# Patient Record
Sex: FEMALE | Race: BLACK OR AFRICAN AMERICAN | Employment: PART TIME | URBAN - METROPOLITAN AREA
[De-identification: names, ages, dates, MRNs, and addresses within clinical notes are randomized per-mention and may not be internally consistent; named-entity substitution may affect disease eponyms.]

---

## 2018-12-11 ENCOUNTER — OFFICE VISIT (OUTPATIENT)
Dept: FAMILY MEDICINE CLINIC | Age: 38
End: 2018-12-11

## 2018-12-11 ENCOUNTER — HOSPITAL ENCOUNTER (OUTPATIENT)
Dept: LAB | Age: 38
Discharge: HOME | End: 2018-12-11

## 2018-12-11 VITALS
WEIGHT: 183 LBS | DIASTOLIC BLOOD PRESSURE: 75 MMHG | SYSTOLIC BLOOD PRESSURE: 114 MMHG | HEART RATE: 78 BPM | HEIGHT: 67 IN | RESPIRATION RATE: 17 BRPM | TEMPERATURE: 98.3 F | BODY MASS INDEX: 28.72 KG/M2 | OXYGEN SATURATION: 100 %

## 2018-12-11 DIAGNOSIS — D50.9 IRON DEFICIENCY ANEMIA, UNSPECIFIED IRON DEFICIENCY ANEMIA TYPE: ICD-10-CM

## 2018-12-11 DIAGNOSIS — M54.50 LOW BACK PAIN WITHOUT SCIATICA, UNSPECIFIED BACK PAIN LATERALITY, UNSPECIFIED CHRONICITY: Primary | ICD-10-CM

## 2018-12-11 DIAGNOSIS — E55.9 VITAMIN D DEFICIENCY: ICD-10-CM

## 2018-12-11 DIAGNOSIS — M62.838 MUSCLE SPASM: ICD-10-CM

## 2018-12-11 DIAGNOSIS — R53.83 FATIGUE, UNSPECIFIED TYPE: ICD-10-CM

## 2018-12-11 DIAGNOSIS — E53.8 VITAMIN B12 DEFICIENCY: ICD-10-CM

## 2018-12-11 DIAGNOSIS — R20.0 NUMBNESS: ICD-10-CM

## 2018-12-11 DIAGNOSIS — M25.50 ARTHRALGIA, UNSPECIFIED JOINT: ICD-10-CM

## 2018-12-11 PROCEDURE — 99001 SPECIMEN HANDLING PT-LAB: CPT

## 2018-12-11 RX ORDER — IBUPROFEN 800 MG/1
TABLET ORAL
COMMUNITY
End: 2018-12-12

## 2018-12-11 RX ORDER — TIZANIDINE HYDROCHLORIDE 2 MG/1
2 CAPSULE, GELATIN COATED ORAL 3 TIMES DAILY
COMMUNITY
End: 2020-02-05 | Stop reason: SDUPTHER

## 2018-12-11 NOTE — PATIENT INSTRUCTIONS
Low Back Pain: Exercises  Your Care Instructions  Here are some examples of typical rehabilitation exercises for your condition. Start each exercise slowly. Ease off the exercise if you start to have pain. Your doctor or physical therapist will tell you when you can start these exercises and which ones will work best for you. How to do the exercises  Press-up    1. Lie on your stomach, supporting your body with your forearms. 2. Press your elbows down into the floor to raise your upper back. As you do this, relax your stomach muscles and allow your back to arch without using your back muscles. As your press up, do not let your hips or pelvis come off the floor. 3. Hold for 15 to 30 seconds, then relax. 4. Repeat 2 to 4 times. Alternate arm and leg (bird dog) exercise    1. Start on the floor, on your hands and knees. 2. Tighten your belly muscles. 3. Raise one leg off the floor, and hold it straight out behind you. Be careful not to let your hip drop down, because that will twist your trunk. 4. Hold for about 6 seconds, then lower your leg and switch to the other leg. 5. Repeat 8 to 12 times on each leg. 6. Over time, work up to holding for 10 to 30 seconds each time. 7. If you feel stable and secure with your leg raised, try raising the opposite arm straight out in front of you at the same time. Knee-to-chest exercise    1. Lie on your back with your knees bent and your feet flat on the floor. 2. Bring one knee to your chest, keeping the other foot flat on the floor (or keeping the other leg straight, whichever feels better on your lower back). 3. Keep your lower back pressed to the floor. Hold for at least 15 to 30 seconds. 4. Relax, and lower the knee to the starting position. 5. Repeat with the other leg. Repeat 2 to 4 times with each leg. 6. To get more stretch, put your other leg flat on the floor while pulling your knee to your chest.    Curl-ups    1.  Lie on the floor on your back with your knees bent at a 90-degree angle. Your feet should be flat on the floor, about 12 inches from your buttocks. 2. Cross your arms over your chest. If this bothers your neck, try putting your hands behind your neck (not your head), with your elbows spread apart. 3. Slowly tighten your belly muscles and raise your shoulder blades off the floor. 4. Keep your head in line with your body, and do not press your chin to your chest.  5. Hold this position for 1 or 2 seconds, then slowly lower yourself back down to the floor. 6. Repeat 8 to 12 times. Pelvic tilt exercise    1. Lie on your back with your knees bent. 2. \"Brace\" your stomach. This means to tighten your muscles by pulling in and imagining your belly button moving toward your spine. You should feel like your back is pressing to the floor and your hips and pelvis are rocking back. 3. Hold for about 6 seconds while you breathe smoothly. 4. Repeat 8 to 12 times. Heel dig bridging    1. Lie on your back with both knees bent and your ankles bent so that only your heels are digging into the floor. Your knees should be bent about 90 degrees. 2. Then push your heels into the floor, squeeze your buttocks, and lift your hips off the floor until your shoulders, hips, and knees are all in a straight line. 3. Hold for about 6 seconds as you continue to breathe normally, and then slowly lower your hips back down to the floor and rest for up to 10 seconds. 4. Do 8 to 12 repetitions. Hamstring stretch in doorway    1. Lie on your back in a doorway, with one leg through the open door. 2. Slide your leg up the wall to straighten your knee. You should feel a gentle stretch down the back of your leg. 3. Hold the stretch for at least 15 to 30 seconds. Do not arch your back, point your toes, or bend either knee. Keep one heel touching the floor and the other heel touching the wall. 4. Repeat with your other leg. 5. Do 2 to 4 times for each leg.     Hip flexor stretch    1. Kneel on the floor with one knee bent and one leg behind you. Place your forward knee over your foot. Keep your other knee touching the floor. 2. Slowly push your hips forward until you feel a stretch in the upper thigh of your rear leg. 3. Hold the stretch for at least 15 to 30 seconds. Repeat with your other leg. 4. Do 2 to 4 times on each side. Wall sit    1. Stand with your back 10 to 12 inches away from a wall. 2. Lean into the wall until your back is flat against it. 3. Slowly slide down until your knees are slightly bent, pressing your lower back into the wall. 4. Hold for about 6 seconds, then slide back up the wall. 5. Repeat 8 to 12 times. Follow-up care is a key part of your treatment and safety. Be sure to make and go to all appointments, and call your doctor if you are having problems. It's also a good idea to know your test results and keep a list of the medicines you take. Where can you learn more? Go to http://anil-vince.info/. Enter J456 in the search box to learn more about \"Low Back Pain: Exercises. \"  Current as of: November 29, 2017  Content Version: 11.8  © 8861-3323 [employer]. Care instructions adapted under license by [employer] (which disclaims liability or warranty for this information). If you have questions about a medical condition or this instruction, always ask your healthcare professional. [place] [id number] any warranty or liability for your use of this information. Learning About Low Back Pain  What is low back pain? Low back pain is pain that can occur anywhere below the ribs and above the legs. It is very common. Almost everyone has it at one time or another. Low back pain can be:  Acute. This is new pain that can last a few days to a few weeksat the most a few months. Chronic. This pain can last for more than a few months. Sometimes it can last for years.   What are some myths about low back pain? Here are some common myths about low back painand the facts:  Myth: \"I need to rest my back when I have back pain. \"  Fact: Staying active won't hurt you. It may help you get better faster. Myth: \"I need prescription pain medicine. \"  Fact: It's best to try to let time and being active heal your back. Opioid pain medicinessuch as hydrocodone or oxycodoneusually don't work any better than over-the-counter medicines like ibuprofen or naproxen. And opioids can cause serious problems like addiction or overdose. Myth: \"I need a test like an X-ray or an MRI to diagnose my low back pain. \"  Fact: Getting a test right away won't help you get better faster. And it could lead you down a treatment path you may not need, since most people get better on their own. What causes low back pain? In most cases, there isn't a clear cause. This can be frustrating, because your back hurts and there's no obvious reason. Your back pain can be caused by:  Overuse or muscle strain. This can happen from playing sports, lifting heavy things, or not being physically fit. A herniated disc. This is a problem with the cushion between the bones in your back. Arthritis. With age, you may have changes in your bones that can narrow the space around your nerves. Other causes. In rare cases, the cause is a serious illness like an infection or cancer. But there are usually other symptoms too. What are the symptoms? Your symptoms depend on your body and the cause of your back pain. You may feel:  · Pain that's sharp or dull. It may be in one small area or over a broad area. But even bad pain doesn't mean that it's caused by something serious. · Leg pain, numbness, or tingling. When a nerve gets squeezedsuch as from a disc problem or arthritisyou may have symptoms in your leg or foot. You can even have leg symptoms from a back problem without having any pain in your back.   How is low back pain diagnosed? A physical exam is the main way to diagnose low back pain. Your doctor may examine your back, check your nerves by testing your reflexes, and make sure that your muscles are strong. He or she also will ask questions about your back and overall health. Most people don't need any tests right away. Tests often don't show the reason for your pain. If your pain lasts more than 6 weeks or you have symptoms that your doctor is more concerned about, he or she may order tests. These may include an X-ray, a CT scan, or an MRI. In some cases, tests can help your doctor find a cause for your pain, especially for pain in one or both legs. How is low back pain treated? Most acute low back pain gets better on its own within a few weeks, no matter what the cause. Time and doing usual activities are all that most people need to feel better. Using heat or ice and taking over-the-counter pain medicine also can help while your body heals. If you aren't getting better on your own or your pain is very bad, your doctor may recommend:  · Physical therapy. · Spinal manipulation, such as by a chiropractor. · Acupuncture. · Massage. · Injections of steroid medicine in your back (especially for pain that involves your legs). If you have chronic low back pain, treatment will help you understand and manage your pain. Treatment may include:  · Staying active. This may include walking or doing back exercises. · Physical therapy. · Medicines. Some of these medicines are also used for other problems, like depression. · Pain management. Your doctor may have you see a pain specialist.  · Counseling. Having chronic pain can be hard. It may help to talk to someone who can help you cope with your pain. Surgery isn't needed for most people. But it may help some types of low back pain. Follow-up care is a key part of your treatment and safety.  Be sure to make and go to all appointments, and call your doctor if you are having problems. It's also a good idea to know your test results and keep a list of the medicines you take. When should you call for help? Call 911 anytime you think you may need emergency care. For example, call if:  · You can't move a leg at all. Call your doctor now or seek immediate medical care if:  · You have new or worse symptoms in your legs, belly, or buttocks. Symptoms may include:  ? Numbness or tingling. ? Weakness. ? Pain. · You lose bladder or bowel control. Watch closely for changes in your health, and be sure to contact your doctor if:  · Along with the back pain, you have a fever, lose weight, or don't feel well. · You do not get better as expected. Where can you learn more? Go to http://anil-vince.info/. Enter A007 in the search box to learn more about \"Learning About Low Back Pain. \"  Current as of: November 29, 2017  Content Version: 11.8  © 9114-0191 [employer]. Care instructions adapted under license by [employer] (which disclaims liability or warranty for this information). If you have questions about a medical condition or this instruction, always ask your healthcare professional. [place] [address] any warranty or liability for your use of this information. Back Stretches: Exercises  Your Care Instructions  Here are some examples of exercises for stretching your back. Start each exercise slowly. Ease off the exercise if you start to have pain. Your doctor or physical therapist will tell you when you can start these exercises and which ones will work best for you. How to do the exercises  Overhead stretch    5. Stand comfortably with your feet shoulder-width apart. 6. Looking straight ahead, raise both arms over your head and reach toward the ceiling. Do not allow your head to tilt back. 7. Hold for 15 to 30 seconds, then lower your arms to your sides. 8. Repeat 2 to 4 times. Side stretch    8.  Stand comfortably with your feet shoulder-width apart. 9. Raise one arm over your head, and then lean to the other side. 10. Slide your hand down your leg as you let the weight of your arm gently stretch your side muscles. Hold for 15 to 30 seconds. 11. Repeat 2 to 4 times on each side. Press-up    7. Lie on your stomach, supporting your body with your forearms. 8. Press your elbows down into the floor to raise your upper back. As you do this, relax your stomach muscles and allow your back to arch without using your back muscles. As your press up, do not let your hips or pelvis come off the floor. 9. Hold for 15 to 30 seconds, then relax. 10. Repeat 2 to 4 times. Relax and rest    7. Lie on your back with a rolled towel under your neck and a pillow under your knees. Extend your arms comfortably to your sides. 8. Relax and breathe normally. 9. Remain in this position for about 10 minutes. 10. If you can, do this 2 or 3 times each day. Follow-up care is a key part of your treatment and safety. Be sure to make and go to all appointments, and call your doctor if you are having problems. It's also a good idea to know your test results and keep a list of the medicines you take. Where can you learn more? Go to http://anil-vince.info/. Enter E846 in the search box to learn more about \"Back Stretches: Exercises. \"  Current as of: November 29, 2017  Content Version: 11.8  © 1616-3035 [employer]. Care instructions adapted under license by [employer] (which disclaims liability or warranty for this information). If you have questions about a medical condition or this instruction, always ask your healthcare professional. [place] [address] any warranty or liability for your use of this information. Low Back Pain: Exercises  Your Care Instructions  Here are some examples of typical rehabilitation exercises for your condition. Start each exercise slowly.  Ease off the exercise if you start to have pain. Your doctor or physical therapist will tell you when you can start these exercises and which ones will work best for you. How to do the exercises  Press-up    9. Lie on your stomach, supporting your body with your forearms. 10. Press your elbows down into the floor to raise your upper back. As you do this, relax your stomach muscles and allow your back to arch without using your back muscles. As your press up, do not let your hips or pelvis come off the floor. 11. Hold for 15 to 30 seconds, then relax. 12. Repeat 2 to 4 times. Alternate arm and leg (bird dog) exercise    12. Start on the floor, on your hands and knees. 13. Tighten your belly muscles. 14. Raise one leg off the floor, and hold it straight out behind you. Be careful not to let your hip drop down, because that will twist your trunk. 15. Hold for about 6 seconds, then lower your leg and switch to the other leg. 16. Repeat 8 to 12 times on each leg. 17. Over time, work up to holding for 10 to 30 seconds each time. 18. If you feel stable and secure with your leg raised, try raising the opposite arm straight out in front of you at the same time. Knee-to-chest exercise    11. Lie on your back with your knees bent and your feet flat on the floor. 12. Bring one knee to your chest, keeping the other foot flat on the floor (or keeping the other leg straight, whichever feels better on your lower back). 13. Keep your lower back pressed to the floor. Hold for at least 15 to 30 seconds. 14. Relax, and lower the knee to the starting position. 15. Repeat with the other leg. Repeat 2 to 4 times with each leg. 16. To get more stretch, put your other leg flat on the floor while pulling your knee to your chest.    Curl-ups    11. Lie on the floor on your back with your knees bent at a 90-degree angle. Your feet should be flat on the floor, about 12 inches from your buttocks.   12. Cross your arms over your chest. If this bothers your neck, try putting your hands behind your neck (not your head), with your elbows spread apart. 13. Slowly tighten your belly muscles and raise your shoulder blades off the floor. 14. Keep your head in line with your body, and do not press your chin to your chest.  15. Hold this position for 1 or 2 seconds, then slowly lower yourself back down to the floor. 16. Repeat 8 to 12 times. Pelvic tilt exercise    5. Lie on your back with your knees bent. 6. \"Brace\" your stomach. This means to tighten your muscles by pulling in and imagining your belly button moving toward your spine. You should feel like your back is pressing to the floor and your hips and pelvis are rocking back. 7. Hold for about 6 seconds while you breathe smoothly. 8. Repeat 8 to 12 times. Heel dig bridging    5. Lie on your back with both knees bent and your ankles bent so that only your heels are digging into the floor. Your knees should be bent about 90 degrees. 6. Then push your heels into the floor, squeeze your buttocks, and lift your hips off the floor until your shoulders, hips, and knees are all in a straight line. 7. Hold for about 6 seconds as you continue to breathe normally, and then slowly lower your hips back down to the floor and rest for up to 10 seconds. 8. Do 8 to 12 repetitions. Hamstring stretch in doorway    6. Lie on your back in a doorway, with one leg through the open door. 7. Slide your leg up the wall to straighten your knee. You should feel a gentle stretch down the back of your leg. 8. Hold the stretch for at least 15 to 30 seconds. Do not arch your back, point your toes, or bend either knee. Keep one heel touching the floor and the other heel touching the wall. 9. Repeat with your other leg. 10. Do 2 to 4 times for each leg. Hip flexor stretch    5. Kneel on the floor with one knee bent and one leg behind you. Place your forward knee over your foot.  Keep your other knee touching the floor. 6. Slowly push your hips forward until you feel a stretch in the upper thigh of your rear leg. 7. Hold the stretch for at least 15 to 30 seconds. Repeat with your other leg. 8. Do 2 to 4 times on each side. Wall sit    6. Stand with your back 10 to 12 inches away from a wall. 7. Lean into the wall until your back is flat against it. 8. Slowly slide down until your knees are slightly bent, pressing your lower back into the wall. 9. Hold for about 6 seconds, then slide back up the wall. 10. Repeat 8 to 12 times. Follow-up care is a key part of your treatment and safety. Be sure to make and go to all appointments, and call your doctor if you are having problems. It's also a good idea to know your test results and keep a list of the medicines you take. Where can you learn more? Go to http://anil-vince.info/. Enter P760 in the search box to learn more about \"Low Back Pain: Exercises. \"  Current as of: November 29, 2017  Content Version: 11.8  © 3840-6709 [employer]. Care instructions adapted under license by [employer] (which disclaims liability or warranty for this information). If you have questions about a medical condition or this instruction, always ask your healthcare professional. [address] any warranty or liability for your use of this information.

## 2018-12-11 NOTE — PROGRESS NOTES
Edin Ferro is a 43 y.o. female presents to office for establish care and back pain.  Numbness on extremities at time    Medication list has been reviewed with Edin Ferro and updated as of today's date     Health Maintenance items with a due date reviewed with patient:  Health Maintenance Due   Topic Date Due    DTaP/Tdap/Td series (1 - Tdap) 10/01/2000    PAP AKA CERVICAL CYTOLOGY  10/01/2000    Influenza Age 9 to Adult  08/01/2018

## 2018-12-11 NOTE — PROGRESS NOTES
Jasson Mccain     Chief Complaint   Patient presents with    Establish Care    Back Pain     Vitals:    18 1131   BP: 114/75   Pulse: 78   Resp: 17   Temp: 98.3 °F (36.8 °C)   TempSrc: Oral   SpO2: 100%   Weight: 183 lb (83 kg)   Height: 5' 7\" (1.702 m)   PainSc:   4         HPI: 43years old female complaining about lower back pain mid back pain and upper back pain for many month, she recently relocated to [place], she has been investigated in the past by doing x-ray and MRI, results are not available they will be requested, as per patient reported that has been negative and she was referred to physical therapy, but she did not attend physical therapy because of her relocation. She has been taking Motrin 800 as needed for pain as well as muscle relaxant Zanaflex. Her condition has not improved, she is been having generalized body ache pain in her muscle fatigue tiredness, and multiple joint pain. She also has been stressed out regarding a diagnosis of her younger son with autism, and that has affected her leg pain she feels anxious, but now she is better since he started treatment. She had a history of iron deficiency anemia she is on iron supplement, also she has a history of vitamin vitamin D deficiency and she has not been on and. History reviewed. No pertinent past medical history. Past Surgical History:   Procedure Laterality Date    HX  SECTION       Social History     Tobacco Use    Smoking status: Never Smoker    Smokeless tobacco: Never Used   Substance Use Topics    Alcohol use: No     Frequency: Never       Family History   Problem Relation Age of Onset    Diabetes Mother     Hypertension Father        Review of Systems   Constitutional: Negative for chills, fever, malaise/fatigue and weight loss. HENT: Negative for congestion, ear discharge, ear pain, hearing loss, nosebleeds, sinus pain and sore throat.     Eyes: Negative for blurred vision, double vision and discharge. Respiratory: Negative for cough, hemoptysis, sputum production, shortness of breath and wheezing. Cardiovascular: Negative for chest pain, palpitations, claudication and leg swelling. Gastrointestinal: Negative for abdominal pain, constipation, diarrhea, nausea and vomiting. Genitourinary: Negative for dysuria, frequency and urgency. Musculoskeletal: Positive for back pain and myalgias. Negative for joint pain and neck pain. Skin: Negative for itching and rash. Neurological: Negative for dizziness, tingling, sensory change, speech change, focal weakness, weakness and headaches. Psychiatric/Behavioral: Negative for depression, hallucinations, substance abuse and suicidal ideas. The patient is nervous/anxious. Physical Exam   Constitutional: She is oriented to person, place, and time. She appears well-developed and well-nourished. No distress. HENT:   Head: Normocephalic and atraumatic. Mouth/Throat: Oropharynx is clear and moist. No oropharyngeal exudate. Eyes: Conjunctivae are normal. Pupils are equal, round, and reactive to light. Right eye exhibits no discharge. Left eye exhibits no discharge. No scleral icterus. Neck: Normal range of motion. Neck supple. No thyromegaly present. Cardiovascular: Normal rate, regular rhythm and normal heart sounds. No murmur heard. Pulmonary/Chest: Effort normal and breath sounds normal. No respiratory distress. She has no wheezes. She has no rales. She exhibits no tenderness. Abdominal: Soft. She exhibits no distension. There is no tenderness. There is no rebound. Musculoskeletal: Normal range of motion. She exhibits tenderness. She exhibits no edema or deformity. Patient is having upper back pain and tenderness due to tense muscles in her shoulders and upper back r back   Lymphadenopathy:     She has no cervical adenopathy. Neurological: She is alert and oriented to person, place, and time. No cranial nerve deficit. Coordination normal.   Skin: Skin is warm and dry. No rash noted. She is not diaphoretic. No erythema. No pallor. Psychiatric: She has a normal mood and affect. Her behavior is normal. Judgment and thought content normal.   Nursing note and vitals reviewed. Assessment and plan     Plan of care has been discussed with the patient, he agrees to the plan and verbalized understanding. All his questions were answered  More than 50% of the time spent in this visit was counseling the patient about  illness and treatment options     Patient will be referred for physical therapy for her back pain and also will be investigated and evaluated for thyroid problems rheumatoid vitamin deficiency        1. Low back pain without sciatica, unspecified back pain laterality, unspecified chronicity      - REFERRAL TO PHYSICAL THERAPY  - meloxicam (MOBIC) 15 mg tablet; Take 1 Tab by mouth daily. Dispense: 30 Tab; Refill: 2    2. Muscle spasm    - REFERRAL TO PHYSICAL THERAPY    3. Fatigue, unspecified type      - TSH 3RD GENERATION; Future  - TSH 3RD GENERATION    4. Numbness      - TSH 3RD GENERATION; Future  - VITAMIN B12; Future  - VITAMIN B12  - TSH 3RD GENERATION    5. Arthralgia, unspecified joint      - C REACTIVE PROTEIN, QT; Future  - RHEUMATOID FACTOR, FLUID; Future  - RHEUMATOID FACTOR, FLUID  - C REACTIVE PROTEIN, QT    6. Vitamin D deficiency    - VITAMIN D, 25 HYDROXY; Future  - VITAMIN D, 25 HYDROXY    7. Iron deficiency anemia, unspecified iron deficiency anemia type    - CBC WITH AUTOMATED DIFF; Future  - CBC WITH AUTOMATED DIFF    8. Vitamin B12 deficiency    - VITAMIN B12; Future  - VITAMIN B12    Current Outpatient Medications   Medication Sig Dispense Refill    meloxicam (MOBIC) 15 mg tablet Take 1 Tab by mouth daily. 30 Tab 2    tiZANidine (ZANAFLEX) 2 mg capsule Take 2 mg by mouth three (3) times daily. There are no active problems to display for this patient.     No results found for this or any previous visit. No results found for any previous visit. Follow-up Disposition:  Return in about 1 month (around 1/11/2019).

## 2018-12-12 RX ORDER — MELOXICAM 15 MG/1
15 TABLET ORAL DAILY
Qty: 30 TAB | Refills: 2 | Status: SHIPPED | OUTPATIENT
Start: 2018-12-12 | End: 2020-12-30

## 2018-12-13 LAB
25(OH)D3+25(OH)D2 SERPL-MCNC: 28.7 NG/ML (ref 30–100)
BASOPHILS # BLD AUTO: 0 X10E3/UL (ref 0–0.2)
BASOPHILS NFR BLD AUTO: 0 %
CRP SERPL-MCNC: 2.5 MG/L (ref 0–4.9)
EOSINOPHIL # BLD AUTO: 0.1 X10E3/UL (ref 0–0.4)
EOSINOPHIL NFR BLD AUTO: 1 %
ERYTHROCYTE [DISTWIDTH] IN BLOOD BY AUTOMATED COUNT: 13.3 % (ref 12.3–15.4)
HCT VFR BLD AUTO: 37.6 % (ref 34–46.6)
HGB BLD-MCNC: 12.1 G/DL (ref 11.1–15.9)
IMM GRANULOCYTES # BLD: 0 X10E3/UL (ref 0–0.1)
IMM GRANULOCYTES NFR BLD: 0 %
LYMPHOCYTES # BLD AUTO: 1.8 X10E3/UL (ref 0.7–3.1)
LYMPHOCYTES NFR BLD AUTO: 20 %
MCH RBC QN AUTO: 29.1 PG (ref 26.6–33)
MCHC RBC AUTO-ENTMCNC: 32.2 G/DL (ref 31.5–35.7)
MCV RBC AUTO: 90 FL (ref 79–97)
MONOCYTES # BLD AUTO: 0.6 X10E3/UL (ref 0.1–0.9)
MONOCYTES NFR BLD AUTO: 7 %
NEUTROPHILS # BLD AUTO: 6.5 X10E3/UL (ref 1.4–7)
NEUTROPHILS NFR BLD AUTO: 72 %
PLATELET # BLD AUTO: 316 X10E3/UL (ref 150–379)
RBC # BLD AUTO: 4.16 X10E6/UL (ref 3.77–5.28)
RHEUMATOID FACT TITR SNV AGGL: NORMAL {TITER}
TSH SERPL DL<=0.005 MIU/L-ACNC: 1.34 UIU/ML (ref 0.45–4.5)
VIT B12 SERPL-MCNC: 277 PG/ML (ref 232–1245)
WBC # BLD AUTO: 9 X10E3/UL (ref 3.4–10.8)

## 2018-12-16 NOTE — PROGRESS NOTES
All results are within normal limits except low vitamin D, patient should take over-the-counter 5000 units daily, vitamin B12 is in the lower range of normal patient would benefit from over-the-counter vitamin B complex supplements.

## 2018-12-17 DIAGNOSIS — E55.9 VITAMIN D DEFICIENCY: Primary | ICD-10-CM

## 2018-12-17 RX ORDER — ERGOCALCIFEROL 1.25 MG/1
50000 CAPSULE ORAL
Qty: 12 CAP | Refills: 1 | Status: SHIPPED | OUTPATIENT
Start: 2018-12-17 | End: 2019-05-13 | Stop reason: SDUPTHER

## 2018-12-18 ENCOUNTER — HOSPITAL ENCOUNTER (OUTPATIENT)
Dept: PHYSICAL THERAPY | Age: 38
Discharge: HOME | End: 2018-12-18
Payer: MEDICAID

## 2018-12-18 PROCEDURE — 97110 THERAPEUTIC EXERCISES: CPT

## 2018-12-18 PROCEDURE — 97161 PT EVAL LOW COMPLEX 20 MIN: CPT

## 2018-12-18 NOTE — PROGRESS NOTES
[address] PHYSICAL THERAPY    [address] [place] [address], [address] [place] [address]       Phone: [phone]  Fax: [phone] / [phone] [facility]  Patient Name: Rosario Ricardo : 1980   Medical   Diagnosis: Lumbar Spine Pain Treatment Diagnosis: Low back pain [M54.5]  Other muscle spasm [M62.838]   Onset Date: Chronic     Referral Source: Gaudencio Vasquez MD Start of [facility]): 2018   Prior Hospitalization: See medical history Provider #: [id number]   Prior Level of Function: Chronic history of sleep disturbances, difficulty with carrying, lifting activities   Comorbidities: None   Medications: Verified on Patient Summary List   The Plan of Care and following information is based on the information from the initial evaluation.   ===========================================================================================  Assessment / key information:  Patient is a 43year old female presenting to therapy with signs and symptoms consistent with chronic lumbar spine pain. Patient states her symptoms began several years ago and have worsened since this time. Patient states most of her symptoms occur while sleeping and cause her to wake up in pain. Patient states she has received x-rays and MRIs which were all normal. Patient did receive PT services when living in [place], however did not continue with the exercises because she was in the process of moving . Patient reports increased difficulty with carrying and lifting activities; sleep disturbances. Patient notes symptoms feel better with movement and stretching. Patient rates pain at worst 9/10 and 2/10 at best.   Objective Data: Inspection-Poor seated posture, forward head, rounded shoulders. Lumbar Spine AROM: flex hands to shins, ext 25% (P!), R Rot 75% (P!), L Rot 75%, R SB mid joint line, L SB mid joint line.  Strength Testing: B glute strength grossly reduced. Flexibility Testing: moderate restriction to B HS, minimal restriction to B PF. Tenderness noted to thoracolumbar junction, pain with P-A mobilization near L1. FOTO: 51/100. Patient educated on diagnosis, prognosis, POC and HEP. Patient issued copy of HEP and denied additional questions. Patient will benefit from skilled PT in order to address these impairments and functional limitations.   ===========================================================================================  Eval Complexity: History LOW Complexity : Zero comorbidities / personal factors that will impact the outcome / POC;  Examination  HIGH Complexity : 4+ Standardized tests and measures addressing body structure, function, activity limitation and / or participation in recreation ; Presentation MEDIUM Complexity : Evolving with changing characteristics ; Decision Making MEDIUM Complexity : FOTO score of 26-74; Overall Complexity LOW   Problem List: pain affecting function, decrease ROM, decrease strength, decrease ADL/ functional abilitiies, decrease activity tolerance, decrease flexibility/ joint mobility and decrease transfer abilities   Treatment Plan may include any combination of the following: Therapeutic exercise, Therapeutic activities, Neuromuscular re-education, Physical agent/modality, Gait/balance training, Manual therapy, Patient education and Functional mobility training  Patient / Family readiness to learn indicated by: asking questions, trying to perform skills and interest  Persons(s) to be included in education: patient (P)  Barriers to Learning/Limitations: no  Measures taken:    Patient Goal (s): Reduce pain, improve sleeping   Patient self reported health status: good  Rehabilitation Potential: good   Short Term Goals: To be accomplished in  3  weeks:  1. Patient will demonstrate independence with HEP for self management of symptoms.   2. Patient will report reduction in pain at worst to 4-5/10 in order to improve sleeping habits.  Long Term Goals: To be accomplished in  6  weeks:  1. Patient will improve FOTO to >/= 61/100 in order to improve quality of life. 2. Patient will demonstrate full, pain free lumbar spine AROM into all planes in order to improve tolerance to bending/lifting techniques. 3. Patient will report reduction in pain at worst to 2-3/10 in order to improve sleeping habits. Frequency / Duration:   Patient to be seen  2  times per week for 6  weeks:  Patient / Caregiver education and instruction: self care, activity modification and exercises  G-Codes (GP): nruys  Therapist Signature: Tiny Castellanos PT Date: 91/35/3395   Certification Period: na Time: 12:38 PM   ===========================================================================================  I certify that the above Physical Therapy Services are being furnished while the patient is under my care. I agree with the treatment plan and certify that this therapy is necessary. Physician Signature:        Date:       Time:     Please sign and return to [facility] or you may fax the signed copy to [phone]. Thank you.

## 2018-12-18 NOTE — PROGRESS NOTES
PHYSICAL THERAPY - DAILY TREATMENT NOTE    Patient Name: Marco Webster        Date: 2018  : 1980   YES Patient  Verified  Visit #:     Insurance: Payor: [employer]ICAID / Plan: Mj Chou / Product Type: Managed Care Medicaid /      In time:  Out time: 71   Total Treatment Time: 30     Medicare Time Tracking (below)   Total Timed Codes (min):  na 1:1 Treatment Time:  na     TREATMENT AREA =  Low back pain [M54.5]  Other muscle spasm [M62.838]    SUBJECTIVE  Pain Level (on 0 to 10 scale):  2  / 10   Medication Changes/New allergies or changes in medical history, any new surgeries or procedures? NO    If yes, update Summary List   Subjective Functional Status/Changes:  []  No changes reported     See POC          OBJECTIVE    10 min Therapeutic Exercise:  [x]  See flow sheet   Rationale:      increase ROM and increase strength to improve the patients ability to perform work activities. min Patient Education:  YES  Reviewed HEP   []  Progressed/Changed HEP based on: Other Objective/Functional Measures:    See POC     Post Treatment Pain Level (on 0 to 10) scale:   2  / 10     ASSESSMENT  Assessment/Changes in Function:     See POC     []  See Progress Note/Recertification   Patient will continue to benefit from skilled PT services to modify and progress therapeutic interventions, address functional mobility deficits, address ROM deficits, address strength deficits, analyze and address soft tissue restrictions, analyze and cue movement patterns, analyze and modify body mechanics/ergonomics and assess and modify postural abnormalities to attain remaining goals.    Progress toward goals / Updated goals:    See POC     PLAN  [x]  Upgrade activities as tolerated YES Continue plan of care   []  Discharge due to :    []  Other:      Therapist: Jozef Eaton PT    Date: 2018 Time: 12:47 PM     Future Appointments   Date Time Provider Hema Flannery   12/27/2018  2:00 PM Yannick BARROW [facility]   1/15/2019  4:15 PM Luz Montaño  N [facility]

## 2018-12-19 ENCOUNTER — DOCUMENTATION ONLY (OUTPATIENT)
Dept: FAMILY MEDICINE CLINIC | Age: 38
End: 2018-12-19

## 2018-12-19 NOTE — PROGRESS NOTES
Medical Records received from office of Marquita Lawrence. Forwarded to Dr. Rosa Castillo for review and then scanning.

## 2018-12-26 ENCOUNTER — TELEPHONE (OUTPATIENT)
Dept: FAMILY MEDICINE CLINIC | Age: 38
End: 2018-12-26

## 2018-12-26 NOTE — TELEPHONE ENCOUNTER
Pt returned call & informed of normal results. Pt was also advised to get suggested OTC vitamins. Pt did not have any additional questions.

## 2018-12-26 NOTE — TELEPHONE ENCOUNTER
----- Message from Nely Killian MD sent at 12/16/2018  4:46 PM EST -----  All results are within normal limits except low vitamin D, patient should take over-the-counter 5000 units daily, vitamin B12 is in the lower range of normal patient would benefit from over-the-counter vitamin B complex supplements.

## 2018-12-27 ENCOUNTER — HOSPITAL ENCOUNTER (OUTPATIENT)
Dept: PHYSICAL THERAPY | Age: 38
Discharge: HOME | End: 2018-12-27
Payer: MEDICAID

## 2018-12-27 PROCEDURE — 97140 MANUAL THERAPY 1/> REGIONS: CPT

## 2018-12-27 PROCEDURE — 97110 THERAPEUTIC EXERCISES: CPT

## 2019-01-02 ENCOUNTER — HOSPITAL ENCOUNTER (OUTPATIENT)
Dept: PHYSICAL THERAPY | Age: 39
Discharge: HOME | End: 2019-01-02
Payer: MEDICAID

## 2019-01-02 PROCEDURE — 97110 THERAPEUTIC EXERCISES: CPT

## 2019-01-02 PROCEDURE — 97140 MANUAL THERAPY 1/> REGIONS: CPT

## 2019-01-02 NOTE — PROGRESS NOTES
PHYSICAL THERAPY - DAILY TREATMENT NOTE Patient Name: Martha Cam        Date: 2019 : 1980   YES Patient  Verified Visit #:   3   of   12  Insurance: Payor: [id number] / Plan: Alicia Razo / Product Type: Managed Care Medicaid / In time: 4:00 PM Out time: 5:08 PM  
Total Treatment Time: 76 Medicare Time Tracking (below) Total Timed Codes (min):  58 1:1 Treatment Time:  40 TREATMENT AREA =  Low back pain [M54.5] Other muscle spasm [M62.838] SUBJECTIVE Pain Level (on 0 to 10 scale): 2  / 10 Medication Changes/New allergies or changes in medical history, any new surgeries or procedures? NO    If yes, update Summary List  
Subjective Functional Status/Changes:  []  No changes reported Pt reports sleeping is when her mid to low back is the most painful. and continues to wake up in the middle of the night from the pain. Pt states she has not attempted to utilize LE wedge at home. OBJECTIVE Modalities Rationale:     decrease inflammation and decrease pain to improve patient's positional tolerance to improve sleep. 
 min [] Estim, type/location:   
                                 []  att     []  unatt     []  w/US     []  w/ice    []  w/heat 
 min []  Mechanical Traction: type/lbs   
               []  pro   []  sup   []  int   []  cont    []  before manual    []  after manual  
 min []  Ultrasound, settings/location:    
 min []  Iontophoresis w/ dexamethasone, location:   
                                           []  take home patch       []  in clinic  
10 min [x]  Ice     []  Heat    location/position: To T/S and L/S in supine with wedge post-session  
 min []  Vasopneumatic Device, press/temp:   
 min []  Other:   
[] Skin assessment post-treatment (if applicable):   
[x]  intact    []  redness- no adverse reaction    
[]redness  adverse reaction:    25/43 min Therapeutic Exercise:  [x]  See flow sheet Rationale:      increase ROM and increase strength to improve the patients ability to perform household activities. 15 min Manual Therapy: STM to (B) QL, thoracolumbar and lumbosacral junction; thoracolumbar P-A mobs in prone Rationale:      decrease pain, increase ROM, increase tissue extensibility and decrease trigger points to improve patient's ability to perform prolonged ambulation with decrease c/o symptoms. min Patient Education:  YES  Reviewed HEP []  Progressed/Changed HEP based on: Other Objective/Functional Measures: 
 
1:1 TE + MT = 40' Added prayer stretch x3 and standing QL stretch Post Treatment Pain Level (on 0 to 10) scale:   3-4  / 10 ASSESSMENT Assessment/Changes in Function:  
 
Demonstrated decrease tightness in mid back with added stretches, however noted increase ms soreness following PT session. Denies sharp pain or red flags during PT session. Discussed utilizing LE wedge at home when sleeping to reduce c/o low back pain; pt responded with good understanding and ordered LE wedge online. []  See Progress Note/Recertification Patient will continue to benefit from skilled PT services to modify and progress therapeutic interventions, address functional mobility deficits, address ROM deficits, address strength deficits, analyze and address soft tissue restrictions, analyze and cue movement patterns, analyze and modify body mechanics/ergonomics and assess and modify postural abnormalities to attain remaining goals. Progress toward goals / Updated goals: 
Minimal progress towards pain reduction goals (STG #2) PLAN [x]  Upgrade activities as tolerated YES Continue plan of care  
[]  Discharge due to :   
[]  Other:   
 
Therapist: Darlynn Alpers, LPTA Date: 1/2/2019 Time: 7:12 PM  
 
Future Appointments Date Time Provider Hema Flannery 1/2/2019  4:00 PM [place] [address]  
1/15/2019  4:15 PM Isabella Tejeda  N [facility]

## 2019-01-03 LAB
RHEUMATOID FACT SERPL-ACNC: <10 IU/ML (ref 0–13.9)
SPECIMEN STATUS REPORT, ROLRST: NORMAL

## 2019-01-08 ENCOUNTER — HOSPITAL ENCOUNTER (OUTPATIENT)
Dept: PHYSICAL THERAPY | Age: 39
Discharge: HOME | End: 2019-01-08
Payer: MEDICAID

## 2019-01-08 PROCEDURE — 97110 THERAPEUTIC EXERCISES: CPT

## 2019-01-08 PROCEDURE — 97140 MANUAL THERAPY 1/> REGIONS: CPT

## 2019-01-08 NOTE — PROGRESS NOTES
PHYSICAL THERAPY - DAILY TREATMENT NOTE Patient Name: Skinny Snyder        Date: 2019 : 1980   YES Patient  Verified Visit #:      12  Insurance: Payor: [id number] [employer] / Plan: Jarrell Waller / Product Type: Managed Care Medicaid / In time: 3:00 PM Out time: 3:59 PM  
Total Treatment Time: 61 Medicare Time Tracking (below) Total Timed Codes (min):  59 1:1 Treatment Time:  54 TREATMENT AREA =  Low back pain [M54.5] Other muscle spasm [M62.838] SUBJECTIVE Pain Level (on 0 to 10 scale): 3  / 10 Medication Changes/New allergies or changes in medical history, any new surgeries or procedures? NO    If yes, update Summary List  
Subjective Functional Status/Changes:  []  No changes reported Pt reports having a busy day yesterday between work and taking care of her child. States that she was unable to perform HEP yesterday, and was only able to perform HEP 2x this weekend. OBJECTIVEModalities Rationale:     decrease inflammation and decrease pain to improve patient's positional tolerance to improve sleep. 
 min [] Estim, type/location:   
                                 []  att     []  unatt     []  w/US     []  w/ice    []  w/heat 
 min []  Mechanical Traction: type/lbs   
               []  pro   []  sup   []  int   []  cont    []  before manual    []  after manual  
 min []  Ultrasound, settings/location:    
 min []  Iontophoresis w/ dexamethasone, location:   
                                           []  take home patch       []  in clinic PD min [x]  Ice     []  Heat    location/position: To T/S and L/S in supine with wedge post-session  
 min []  Vasopneumatic Device, press/temp:   
 min []  Other:   
[] Skin assessment post-treatment (if applicable):   
[x]  intact    []  redness- no adverse reaction    
[]redness  adverse reaction:    44/49 min Therapeutic Exercise:  [x]  See flow sheet Rationale:      increase ROM and increase strength to improve the patients ability to perform household activities. 10 min Manual Therapy: STM to (B) QL, upper glutes, thoracolumbar and lumbosacral junction Rationale:      decrease pain, increase ROM, increase tissue extensibility and decrease trigger points to improve patient's ability to perform prolonged ambulation with decrease c/o symptoms. Billed With/As: 
 [] TE 
 [] TA 
 [] Neuro 
 [] Self Care Patient Education: [x] Review HEP [x] Progressed/Changed HEP based on: improving activity tolerance and improving L/S ROM/flexibility [] positioning   [] body mechanics   [] transfers   [] heat/ice application   
[x] other: added standing L/S flexion stretch, (B) QL stretch, and (B) longsit hamstring stretch Other Objective/Functional Measures: 
 
1:1 TE + MT = 54' Added SKTC to alleviate c/o (R) sided pain Post Treatment Pain Level (on 0 to 10) scale:   4 / 10 ASSESSMENT Assessment/Changes in Function:  
 
Discussed importance of compliance with HEP to manage symptoms and improve L/S ROM as well as improve sleep tolerance; pt responded with understanding. []  See Progress Note/Recertification Patient will continue to benefit from skilled PT services to modify and progress therapeutic interventions, address functional mobility deficits, address ROM deficits, address strength deficits, analyze and address soft tissue restrictions, analyze and cue movement patterns, analyze and modify body mechanics/ergonomics and assess and modify postural abnormalities to attain remaining goals. Progress toward goals / Updated goals: No significant progress towards PT goals during today's PT session Will continue to follow up with pt on HEP compliance PLAN [x]  Upgrade activities as tolerated YES Continue plan of care  
[]  Discharge due to :   
[]  Other:   
 
Therapist: SERGIO Fallon Date: 1/8/2019 Time: 4:11 PM  
 
 Future Appointments Date Time Provider Hema Flannery 1/8/2019  3:00 PM Radha BARROW [facility]  
[facility]10[facility]2019  [facility]  
1/15/2019  4:15 PM Jes Dodd MD [address] [place] [address]

## 2019-01-10 ENCOUNTER — HOSPITAL ENCOUNTER (OUTPATIENT)
Dept: PHYSICAL THERAPY | Age: 39
End: 2019-01-10
Payer: MEDICAID

## 2019-01-17 ENCOUNTER — HOSPITAL ENCOUNTER (OUTPATIENT)
Dept: PHYSICAL THERAPY | Age: 39
Discharge: HOME | End: 2019-01-17
Payer: MEDICAID

## 2019-01-17 PROCEDURE — 97110 THERAPEUTIC EXERCISES: CPT

## 2019-01-17 PROCEDURE — 97140 MANUAL THERAPY 1/> REGIONS: CPT

## 2019-01-17 NOTE — PROGRESS NOTES
PHYSICAL THERAPY - DAILY TREATMENT NOTE Patient Name: Colten Dixon        Date: 2019 : 1980   YES Patient  Verified Visit #:      of   12  Insurance: Payor: [id number] [employer] / Plan: Robert Rowley / Product Type: Managed Care Medicaid / In time: 3:08 PM Out time: 4:04 PM  
Total Treatment Time: 64 Medicare Time Tracking (below) Total Timed Codes (min): 56 1:1 Treatment Time:  40 TREATMENT AREA =  Low back pain [M54.5] Other muscle spasm [M62.838] SUBJECTIVE Pain Level (on 0 to 10 scale):  10 Medication Changes/New allergies or changes in medical history, any new surgeries or procedures? NO    If yes, update Summary List  
Subjective Functional Status/Changes:  []  No changes reported SEE PN 
  
 
OBJECTIVE 
30/46 min Therapeutic Exercise:  [x]  See flow sheet Rationale:      increase ROM and increase strength to improve the patients ability to perform household activities. 10 min Manual Therapy: STM to (B) QL, upper glutes, thoracolumbar and lumbosacral junction Rationale:      decrease pain, increase ROM, increase tissue extensibility and decrease trigger points to improve patient's ability to perform prolonged ambulation with decrease c/o symptoms. Billed With/As: 
 [x] TE 
 [] TA 
 [] Neuro 
 [] Self Care Patient Education: [x] Review HEP [] Progressed/Changed HEP based on: 
[] positioning   [] body mechanics   [] transfers   [] heat/ice application   
[] other:   
Other Objective/Functional Measures: 
 
1:1 TE + MT = 40' FOTO: 50 
GROC: +3 
Current L/S AROM: WNL in all directions SEE PN Post Treatment Pain Level (on 0 to 10) scale:  3 / 10 ASSESSMENT Assessment/Changes in Function: SEE PN   
[]  See Progress Note/Recertification Patient will continue to benefit from skilled PT services to modify and progress therapeutic interventions, address functional mobility deficits, address ROM deficits, address strength deficits, analyze and address soft tissue restrictions, analyze and cue movement patterns, analyze and modify body mechanics/ergonomics and assess and modify postural abnormalities to attain remaining goals. Progress toward goals / Updated goals: 1. Patient will improve FOTO to >/= 61/100 in order to improve quality of life. Not met; 50/100 (decrease by 1 point) 2. Patient will demonstrate full, pain free lumbar spine AROM into all planes in order to improve tolerance to bending/lifting techniques. Met; full, pain free ROM 3. Patient will report reduction in pain at worst to 2-3/10 in order to improve sleeping habits. Progressing; max pain 4/10 PLAN [x]  Upgrade activities as tolerated YES Continue plan of care  
[]  Discharge due to :   
[]  Other:   
 
Therapist: SERGIO Reynoso Date: 1/17/2019 Time: 6:08 PM  
 
Future Appointments Date Time Provider Hema Flannery 1/17/2019  3:00 PM [facility]  
1/21/2019  9:30 AM Garrison Otero  [facility]

## 2019-01-17 NOTE — PROGRESS NOTES
Viktor Shabazz [address] [place] [address] THERAPY 
[address],Kaitlyn Bellamy, [facility] - Phone: [phone]  Fax: [phone] PROGRESS NOTE Patient Name: Berenice Stevens : 1980 Treatment/Medical Diagnosis: Low back pain [M54.5] Other muscle spasm [M62.838] Referral Source: Ashleigh Alba MD    
Date of Initial Visit: 18 Attended Visits: 5 Missed Visits: 1 SUMMARY OF TREATMENT Pt has attended 5 PT sessions over the last month, including an initial evaluation, for her low back pain. PT has included therapeutic exercises, manual therapy, patient education, postural education, and home exercise program to improve L/S ROM and flexibility as well as core stability and endurance. Modalities utilized for pain control. CURRENT STATUS Pt has made limited progress towards her PT goals due to inconsistent PT attendance and non-compliance with HEP. Pt reports 40% improvement with low back pain; states pain ranges from 2/10 to 4/10. Continues to report most pain when sleeping. Demonstrates full, pain free L/S AROM in all directions despite limited progress. Current FOTO = 50/100 (decrease by 1 point) and +3 (somewhat better) on global rating of change (GROC) denoting minimal functional improvement. Goal/Measure of Progress Goal Met? 1. Patient will improve FOTO to >/= 61/100 in order to improve quality of life. Status at last Eval: 51/100 Current Status: 50/100 Not met 2. Patient will demonstrate full, pain free lumbar spine AROM into all planes in order to improve tolerance to bending/lifting techniques. Status at last Eval: flex hands to shins, ext 25% (P!), R Rot 75% (P!), L Rot 75%, R SB mid joint line, L SB mid joint line Current Status: Full, pain free ROM in all directions met 3. Patient will report reduction in pain at worst to 2-3/10 in order to improve sleeping habits. Status at last Eval: 9/10 Current Status: /10 progressing New Goals to be achieved in __3__  weeks: 1. Patient will be independent and compliant with long term HEP to maintain gains made from physical therapy. 2. Patient will report reduction in pain at worst to 2-3/10 in order to improve sleeping habits. 3. Patient will report >/= to +5 on GROC denoting functional improvement and improved quality of life in preparation for D/C. RECOMMENDATIONS Patient would continue to benefit from skilled PT for 2x/wk for 3 weeks to address remaining functional limitations and attain long-term goals. If you have any questions/comments please contact us directly at [phone]. Thank you for allowing us to assist in the care of your patient. Therapist Signature: SERGIO Villela Date: 1/17/2019 Time: 6:49 PM  
NOTE TO PHYSICIAN:  PLEASE COMPLETE THE ORDERS BELOW AND FAX TO [facility]: [phone] If you are unable to process this request in 24 hours please contact our office: [phone] 
 
___ I have read the above report and request that my patient continue as recommended.  
___ I have read the above report and request that my patient continue therapy with the following changes/special instructions:_________________________________________________________  
___ I have read the above report and request that my patient be discharged from therapy.   
 
Physician Signature:       Date:      Time:

## 2019-01-21 ENCOUNTER — HOSPITAL ENCOUNTER (OUTPATIENT)
Dept: PHYSICAL THERAPY | Age: 39
Discharge: HOME | End: 2019-01-21
Payer: MEDICAID

## 2019-01-21 ENCOUNTER — OFFICE VISIT (OUTPATIENT)
Dept: FAMILY MEDICINE CLINIC | Age: 39
End: 2019-01-21

## 2019-01-21 ENCOUNTER — TELEPHONE (OUTPATIENT)
Dept: FAMILY MEDICINE CLINIC | Age: 39
End: 2019-01-21

## 2019-01-21 VITALS
BODY MASS INDEX: 27.94 KG/M2 | WEIGHT: 178 LBS | HEIGHT: 67 IN | HEART RATE: 85 BPM | DIASTOLIC BLOOD PRESSURE: 72 MMHG | RESPIRATION RATE: 17 BRPM | SYSTOLIC BLOOD PRESSURE: 104 MMHG | TEMPERATURE: 97.7 F | OXYGEN SATURATION: 98 %

## 2019-01-21 DIAGNOSIS — E55.9 VITAMIN D DEFICIENCY: ICD-10-CM

## 2019-01-21 DIAGNOSIS — G89.29 CHRONIC BILATERAL LOW BACK PAIN, WITH SCIATICA PRESENCE UNSPECIFIED: Primary | ICD-10-CM

## 2019-01-21 DIAGNOSIS — M54.5 CHRONIC BILATERAL LOW BACK PAIN, WITH SCIATICA PRESENCE UNSPECIFIED: Primary | ICD-10-CM

## 2019-01-21 DIAGNOSIS — M62.838 MUSCLE SPASM: ICD-10-CM

## 2019-01-21 DIAGNOSIS — E66.3 OVERWEIGHT (BMI 25.0-29.9): ICD-10-CM

## 2019-01-21 PROBLEM — M54.50 CHRONIC BILATERAL LOW BACK PAIN: Status: ACTIVE | Noted: 2019-01-21

## 2019-01-21 PROCEDURE — 97140 MANUAL THERAPY 1/> REGIONS: CPT

## 2019-01-21 PROCEDURE — 97110 THERAPEUTIC EXERCISES: CPT

## 2019-01-21 NOTE — PROGRESS NOTES
Candidamargarita Liset     Chief Complaint   Patient presents with    Back Pain     Vitals:    19 0934   BP: 104/72   Pulse: 85   Resp: 17   Temp: 97.7 °F (36.5 °C)   TempSrc: Oral   SpO2: 98%   Weight: 178 lb (80.7 kg)   Height: 5' 7\" (1.702 m)   PainSc:   3         HPI: Is here to follow-up on low back pain, her records has been received from previous doctor showed normal imaging for her lower back, records were sent to scanning. Lab work was done and has been reviewed with the patient over the phone, no signs of inflammation, she has low vitamin D and she has been taking 50,000 units weekly. Patient has attended physical therapy lesion and 5 other sessions following evaluation, she has much improved with physical therapy but due to family issues she had to interrupt her sessions at some point, when she does not do physical therapy sessions nor does exercise that was prescribed to her at home by physical therapist she feels the pain is coming back and she had lower back pain especially at night when she sleeps or further pain in the morning when she wakes up. Otherwise patient has no complaint other than being slightly anxious and stressed out to you to her son who is autistic and he is getting treatment sessions as per patient he is improving. Patient does work and retail as a part-time and she is planning to continue to do so. She had a Pap smear done in the last 2 years records are not available for review . They will be requested from prep from her previous provider. History reviewed. No pertinent past medical history.   Past Surgical History:   Procedure Laterality Date    HX  SECTION       Social History     Tobacco Use    Smoking status: Never Smoker    Smokeless tobacco: Never Used   Substance Use Topics    Alcohol use: No     Frequency: Never       Family History   Problem Relation Age of Onset    Diabetes Mother     Hypertension Father        Review of Systems   Constitutional: Negative for chills, fever, malaise/fatigue and weight loss. HENT: Negative for congestion, ear discharge, ear pain, hearing loss, nosebleeds, sinus pain and sore throat. Eyes: Negative for blurred vision, double vision and discharge. Respiratory: Negative for cough, hemoptysis, sputum production, shortness of breath and wheezing. Cardiovascular: Negative for chest pain, palpitations, claudication and leg swelling. Gastrointestinal: Negative for abdominal pain, constipation, diarrhea, nausea and vomiting. Genitourinary: Negative for dysuria, frequency, hematuria and urgency. Musculoskeletal: Positive for back pain. Negative for joint pain, myalgias and neck pain. Skin: Negative for itching and rash. Neurological: Negative for dizziness, tingling, sensory change, speech change, focal weakness, seizures, weakness and headaches. Psychiatric/Behavioral: Negative for depression, hallucinations, substance abuse and suicidal ideas. The patient is nervous/anxious. The patient does not have insomnia. Physical Exam   Constitutional: She is oriented to person, place, and time. She appears well-developed and well-nourished. No distress. HENT:   Head: Normocephalic and atraumatic. Mouth/Throat: Oropharynx is clear and moist. No oropharyngeal exudate. Eyes: Conjunctivae are normal. Pupils are equal, round, and reactive to light. Right eye exhibits no discharge. Left eye exhibits no discharge. No scleral icterus. Neck: Normal range of motion. Neck supple. No thyromegaly present. Cardiovascular: Normal rate, regular rhythm and normal heart sounds. No murmur heard. Pulmonary/Chest: Effort normal and breath sounds normal. No respiratory distress. She has no wheezes. She has no rales. She exhibits no tenderness. Abdominal: Soft. She exhibits no distension. There is no tenderness. There is no rebound. Musculoskeletal: Normal range of motion.  She exhibits tenderness. She exhibits no edema or deformity. Lower back on both sides there is tenderness with palpation and also with maximum flexion of the lower back there is no tenderness on the spine. Patient is able to bend down walk on her heels and walk on her toes and able to squat, her movements are associated with some discomfort. Lymphadenopathy:     She has no cervical adenopathy. Neurological: She is alert and oriented to person, place, and time. No cranial nerve deficit. Coordination normal.   Skin: Skin is warm and dry. No rash noted. She is not diaphoretic. No erythema. No pallor. Psychiatric: She has a normal mood and affect. Her behavior is normal. Judgment and thought content normal.   Nursing note and vitals reviewed. Assessment and plan     Plan of care has been discussed with the patient, he agrees to the plan and verbalized understanding. All his questions were answered  More than 50% of the time spent in this visit was counseling the patient about  illness and treatment options         1. Chronic bilateral low back pain, with sciatica presence unspecified  Advised to continue physical therapy adherent to the home exercise that she provided by physical therapy. 2. Muscle spasm  She is not taking muscle relaxer, and she is using stretching and physical therapy exercise. Vitamin D deficiency. Taking 50,000units weekly, she take it for 6 months and then after that to continue his 5000 unit over-the-counter daily. Overweight      Following up lifestyle modification she has already lost about 7-8 pounds since last visit. Current Outpatient Medications   Medication Sig Dispense Refill    ergocalciferol (ERGOCALCIFEROL) 50,000 unit capsule Take 1 Cap by mouth every seven (7) days. 12 Cap 1    meloxicam (MOBIC) 15 mg tablet Take 1 Tab by mouth daily. 30 Tab 2    tiZANidine (ZANAFLEX) 2 mg capsule Take 2 mg by mouth three (3) times daily.          Patient Active Problem List    Diagnosis Date Noted    Chronic bilateral low back pain 01/21/2019    Vitamin D deficiency 01/21/2019     Results for orders placed or performed in visit on 12/11/18   RHEUMATOID FACTOR, FLUID   Result Value Ref Range    Rheumatoid factor, Qt, Fluid CANCELED    VITAMIN D, 25 HYDROXY   Result Value Ref Range    VITAMIN D, 25-HYDROXY 28.7 (L) 30.0 - 100.0 ng/mL   C REACTIVE PROTEIN, QT   Result Value Ref Range    C-Reactive Protein, Qt 2.5 0.0 - 4.9 mg/L   CBC WITH AUTOMATED DIFF   Result Value Ref Range    WBC 9.0 3.4 - 10.8 x10E3/uL    RBC 4.16 3.77 - 5.28 x10E6/uL    HGB 12.1 11.1 - 15.9 g/dL    HCT 37.6 34.0 - 46.6 %    MCV 90 79 - 97 fL    MCH 29.1 26.6 - 33.0 pg    MCHC 32.2 31.5 - 35.7 g/dL    RDW 13.3 12.3 - 15.4 %    PLATELET 981 214 - 893 x10E3/uL    NEUTROPHILS 72 Not Estab. %    Lymphocytes 20 Not Estab. %    MONOCYTES 7 Not Estab. %    EOSINOPHILS 1 Not Estab. %    BASOPHILS 0 Not Estab. %    ABS. NEUTROPHILS 6.5 1.4 - 7.0 x10E3/uL    Abs Lymphocytes 1.8 0.7 - 3.1 x10E3/uL    ABS. MONOCYTES 0.6 0.1 - 0.9 x10E3/uL    ABS. EOSINOPHILS 0.1 0.0 - 0.4 x10E3/uL    ABS. BASOPHILS 0.0 0.0 - 0.2 x10E3/uL    IMMATURE GRANULOCYTES 0 Not Estab. %    ABS. IMM. GRANS. 0.0 0.0 - 0.1 x10E3/uL   VITAMIN B12   Result Value Ref Range    Vitamin B12 277 232 - 1,245 pg/mL   TSH 3RD GENERATION   Result Value Ref Range    TSH 1.340 0.450 - 4.500 uIU/mL   RHEUMATOID FACTOR, QL   Result Value Ref Range    Rheumatoid factor <10.0 0.0 - 13.9 IU/mL   SPECIMEN STATUS REPORT   Result Value Ref Range    SPECIMEN STATUS REPORT COMMENT      Office Visit on 12/11/2018   Component Date Value Ref Range Status    Rheumatoid factor, Qt, Fluid 12/11/2018 CANCELED   Final-Edited    Comment: Results of this test are for Investigational Purposes Only.   The  performance characteristics of this assay have been determined by  [employer].  The result should not be used as a diagnostic procedure  without confirmation of the diagnosis by another medically  established diagnostic product or procedure. Result canceled by the ancillary.  VITAMIN D, 25-HYDROXY 12/11/2018 28.7* 30.0 - 100.0 ng/mL Final    Comment: Vitamin D deficiency has been defined by the [facility] practice guideline as a  level of serum 25-OH vitamin D less than 20 ng/mL (1,2). The Endocrine Society went on to further define vitamin D  insufficiency as a level between 21 and 29 ng/mL (2). 1. IOM ([place] [employer]). 2010. Dietary reference     intakes for calcium and D. [facility]: The     CRAiLAR. 2. Shreyas MF, Suraj NC, Aniceto BATRES, et al.     Evaluation, treatment, and prevention of vitamin D     deficiency: an Endocrine Society clinical practice     guideline. JCEM. 2011 Jul; 96(1):1911-30.  C-Reactive Protein, Qt 12/11/2018 2.5  0.0 - 4.9 mg/L Final    WBC 12/11/2018 9.0  3.4 - 10.8 x10E3/uL Final    RBC 12/11/2018 4.16  3.77 - 5.28 x10E6/uL Final    HGB 12/11/2018 12.1  11.1 - 15.9 g/dL Final    HCT 12/11/2018 37.6  34.0 - 46.6 % Final    MCV 12/11/2018 90  79 - 97 fL Final    MCH 12/11/2018 29.1  26.6 - 33.0 pg Final    MCHC 12/11/2018 32.2  31.5 - 35.7 g/dL Final    RDW 12/11/2018 13.3  12.3 - 15.4 % Final    PLATELET 69/21/1802 361  150 - 379 x10E3/uL Final    NEUTROPHILS 12/11/2018 72  Not Estab. % Final    Lymphocytes 12/11/2018 20  Not Estab. % Final    MONOCYTES 12/11/2018 7  Not Estab. % Final    EOSINOPHILS 12/11/2018 1  Not Estab. % Final    BASOPHILS 12/11/2018 0  Not Estab. % Final    ABS. NEUTROPHILS 12/11/2018 6.5  1.4 - 7.0 x10E3/uL Final    Abs Lymphocytes 12/11/2018 1.8  0.7 - 3.1 x10E3/uL Final    ABS. MONOCYTES 12/11/2018 0.6  0.1 - 0.9 x10E3/uL Final    ABS. EOSINOPHILS 12/11/2018 0.1  0.0 - 0.4 x10E3/uL Final    ABS. BASOPHILS 12/11/2018 0.0  0.0 - 0.2 x10E3/uL Final    IMMATURE GRANULOCYTES 12/11/2018 0  Not Estab. % Final    ABS. IMM. GRANS. 12/11/2018 0.0  0.0 - 0.1 x10E3/uL Final    Vitamin B12 12/11/2018 277  232 - 1,245 pg/mL Final    TSH 12/11/2018 1.340  0.450 - 4.500 uIU/mL Final    Rheumatoid factor 12/11/2018 <10.0  0.0 - 13.9 IU/mL Final    SPECIMEN STATUS REPORT 12/11/2018 COMMENT   Final    Comment: Written Authorization  Written Authorization  No Written Authorization Received. Follow-up Disposition:  Return if symptoms worsen or fail to improve.

## 2019-01-21 NOTE — PROGRESS NOTES
Jj Escalera is a 43 y.o. female presents to office for follow up back patient    Medication list has been reviewed with Jj Escalera and updated as of today's date     Health Maintenance items with a due date reviewed with patient:  Health Maintenance Due   Topic Date Due    PAP AKA CERVICAL CYTOLOGY  10/01/2000    Influenza Age 5 to Adult  08/01/2018    MEDICARE YEARLY EXAM  01/17/2019

## 2019-01-21 NOTE — PROGRESS NOTES
PHYSICAL THERAPY - DAILY TREATMENT NOTE Patient Name: Iliana Yost        Date: 2019 : 1980   yes Patient  Verified Visit #:     Insurance: Payor: [id number] SourceMedical / Plan: Blanche [place] / Product Type: Managed Care Medicaid / In time: 331 Out time: 435 Total Treatment Time: 59 Medicare/BCBS Time Tracking (below) Total Timed Codes (min):  54 1:1 Treatment Time:  35 TREATMENT AREA =  Low back pain [M54.5] Other muscle spasm [M62.838] SUBJECTIVE Pain Level (on 0 to 10 scale):  2  / 10 Medication Changes/New allergies or changes in medical history, any new surgeries or procedures?    no  If yes, update Summary List  
Subjective Functional Status/Changes:  []  No changes reported Patient states she notices an improvement in her sleeping habits when she is consistent with her HEP. Patient reports over the past few days she has been trying to do her exercises more often. OBJECTIVE Modalities Rationale:     decrease pain and increase tissue extensibility to improve patient's ability to perform household activities. min [] Estim, type/location:   
                                 []  att     []  unatt     []  w/US     []  w/ice    []  w/heat 
 min []  Mechanical Traction: type/lbs   
               []  pro   []  sup   []  int   []  cont    []  before manual    []  after manual  
 min []  Ultrasound, settings/location:    
 min []  Iontophoresis w/ dexamethasone, location:   
                                           []  take home patch       []  in clinic  
10 min []  Ice     [x]  Heat    location/position: To lumbar spine in supine with bolster  
 min []  Vasopneumatic Device, press/temp:   
 min []  Other:   
[x] Skin assessment post-treatment (if applicable):   
[x]  intact    []  redness- no adverse reaction    
[]redness  adverse reaction:     
44 min Therapeutic Exercise:  [x]  See flow sheet Rationale:      increase ROM and increase strength to improve the patients ability to perform walking activities. 10 min Manual Therapy: STM to B lumbar paraspinals and QL in prone Rationale:      decrease pain, increase ROM, increase tissue extensibility and decrease trigger points to improve patient's ability to perform standing activities. Billed With/As: 
 [x] TE 
 [] TA 
 [] Neuro 
 [] Self Care Patient Education: [x] Review HEP [] Progressed/Changed HEP based on:  
[] positioning   [] body mechanics   [] transfers   [] heat/ice application   
[] other:   
Other Objective/Functional Measures: 
 
Patient presenting with tenderness to B lumbar paraspinals and QL during MT R>L Added RTB to SL clams this session in order to improve lateral hip strength Post Treatment Pain Level (on 0 to 10) scale:   2  / 10 ASSESSMENT Assessment/Changes in Function:  
 
Patient denied changes in symptoms post session. Patient instructed to continue with PT 1x/week for 2-3 weeks in order to assess tolerance to reduced PT and place emphasis on HEP. []  See Progress Note/Recertification Patient will continue to benefit from skilled PT services to modify and progress therapeutic interventions, address functional mobility deficits, address ROM deficits, address strength deficits, analyze and address soft tissue restrictions, analyze and cue movement patterns, analyze and modify body mechanics/ergonomics and assess and modify postural abnormalities to attain remaining goals. Progress toward goals / Updated goals: 
Progressing with LTG#2 with performance of HEP  
 
PLAN [x]  Upgrade activities as tolerated yes Continue plan of care  
[]  Discharge due to :   
[]  Other:   
 
Therapist: Tuan Grimm PT Date: 1/21/2019 Time: 4:28 PM  
 
Future Appointments Date Time Provider Hema Flannery 1/24/2019  3:00 PM Angelo Cabrera, PT [facility]

## 2019-01-24 ENCOUNTER — APPOINTMENT (OUTPATIENT)
Dept: PHYSICAL THERAPY | Age: 39
End: 2019-01-24
Payer: MEDICAID

## 2019-03-08 NOTE — PROGRESS NOTES
Viktor Shabazz [facility] PHYSICAL THERAPY  [facility], [facility], [address] [place] [address] - Phone: [phone]  Fax: [phone]  DISCHARGE SUMMARY  Patient Name: Richa Vásquez : 1980   Treatment/Medical Diagnosis: Low back pain [M54.5]  Other muscle spasm [M62.838]   Referral Source: Michelle Murillo MD     Date of Initial Visit: 18 Attended Visits: 6 Missed Visits: 2     SUMMARY OF TREATMENT  Pt attended 6 PT sessions over the last month, including an initial evaluation, for her low back pain. PT has included therapeutic exercises, manual therapy, patient education, postural education, and home exercise program to improve L/S ROM and flexibility as well as core stability and endurance. Modalities utilized for pain control. CURRENT STATUS  Pt was last seen on 19 for her low back pain. Stated 2/10 pain during PT session and reported she noticed an improvement in sleeping habits when she consistently performs her HEP. Pt did not return to physical therapy therefore a formal reassessment of goals was not performed and previous progress note PT goals have not changed. Goal/Measure of Progress Goal Met? 1. Patient will be independent and compliant with long term HEP to maintain gains made from physical therapy. Status at last Eval: Semi to non-compliant with basic HEP Current Status: Unable to assess N/A   2. Patient will report reduction in pain at worst to 2-3/10 in order to improve sleeping habits. Status at last Eval: 4/10 Current Status: Unable to assess N/A   3. Patient will report >/= to +5 on GROC denoting functional improvement and improved quality of life in preparation for D/C. Status at last Eval: +3 Current Status: Unable to assess N/A     RECOMMENDATIONS  Discontinue therapy due to lack of attendance or compliance. If you have any questions/comments please contact us directly at [phone].    Thank you for allowing us to assist in the care of your patient. Therapist Signature: SERGIO Zapata Date: 03/08/19     Time: 9:47 AM     NOTE TO PHYSICIAN:  Your patient's insurance requires this discharge note be signed and returned. PLEASE COMPLETE THE ORDERS BELOW AND RETURN TO:  [facility] PHYSICAL THERAPY    ___ I have read the above report and request that my patient be discharged from therapy.      Physician Signature:        Date:       Time:

## 2019-05-13 ENCOUNTER — OFFICE VISIT (OUTPATIENT)
Dept: FAMILY MEDICINE CLINIC | Age: 39
End: 2019-05-13

## 2019-05-13 VITALS
OXYGEN SATURATION: 100 % | HEART RATE: 76 BPM | DIASTOLIC BLOOD PRESSURE: 65 MMHG | RESPIRATION RATE: 17 BRPM | BODY MASS INDEX: 26.68 KG/M2 | WEIGHT: 170 LBS | HEIGHT: 67 IN | SYSTOLIC BLOOD PRESSURE: 98 MMHG | TEMPERATURE: 97.2 F

## 2019-05-13 DIAGNOSIS — E55.9 VITAMIN D DEFICIENCY: ICD-10-CM

## 2019-05-13 DIAGNOSIS — G89.29 CHRONIC LOW BACK PAIN WITHOUT SCIATICA, UNSPECIFIED BACK PAIN LATERALITY: ICD-10-CM

## 2019-05-13 DIAGNOSIS — R10.9 FLANK PAIN: Primary | ICD-10-CM

## 2019-05-13 DIAGNOSIS — R11.0 NAUSEA: ICD-10-CM

## 2019-05-13 DIAGNOSIS — M54.50 CHRONIC LOW BACK PAIN WITHOUT SCIATICA, UNSPECIFIED BACK PAIN LATERALITY: ICD-10-CM

## 2019-05-13 DIAGNOSIS — L85.8 KERATOSIS PILARIS: ICD-10-CM

## 2019-05-13 DIAGNOSIS — R53.83 OTHER FATIGUE: ICD-10-CM

## 2019-05-13 LAB
BILIRUB UR QL STRIP: NORMAL
GLUCOSE UR-MCNC: NEGATIVE MG/DL
KETONES P FAST UR STRIP-MCNC: NORMAL MG/DL
PH UR STRIP: 6 [PH] (ref 4.6–8)
PROT UR QL STRIP: NORMAL
SP GR UR STRIP: 1.03 (ref 1–1.03)
UA UROBILINOGEN AMB POC: NORMAL (ref 0.2–1)
URINALYSIS CLARITY POC: CLEAR
URINALYSIS COLOR POC: YELLOW
URINE BLOOD POC: NEGATIVE
URINE LEUKOCYTES POC: NEGATIVE
URINE NITRITES POC: NEGATIVE

## 2019-05-13 RX ORDER — ERGOCALCIFEROL 1.25 MG/1
50000 CAPSULE ORAL
Qty: 12 CAP | Refills: 1 | Status: SHIPPED | OUTPATIENT
Start: 2019-05-13 | End: 2020-02-27 | Stop reason: SDUPTHER

## 2019-05-13 RX ORDER — IBUPROFEN 400 MG/1
TABLET ORAL
COMMUNITY
End: 2020-12-30

## 2019-05-13 RX ORDER — TRIAMCINOLONE ACETONIDE 1 MG/G
CREAM TOPICAL 2 TIMES DAILY
Qty: 45 G | Refills: 0 | Status: SHIPPED | OUTPATIENT
Start: 2019-05-13 | End: 2020-02-26

## 2019-05-13 NOTE — PROGRESS NOTES
Simone Bermudez     Chief Complaint   Patient presents with    LOW BACK PAIN           HPI: Patient is here for evaluation for right flank pain for about 1 week, she has been practicing Sikhism fasting  During the [facility] month of adan where she does drink water during the day, and she has been doing that for the last 7 days, continue to use the pain secondary, when she drinks water in the evening but slightly better. She has not had any fever no chills no dysuria, she has fatigue and nausea but no vomiting. No history of kidney stones no history of urinary tract infection no vagina discharge. She took some Tylenol that helped her , her pain is about 5 out of 10 intermittent, is localized in the flank no radiation. Patient has a bilateral arm  Rash  has been for long time, is a tiny hyperpigmented papules not itchy ,but spreading also in her back , she was treated with a strategy in the past but the medication did not            History reviewed. No pertinent past medical history. Past Surgical History:   Procedure Laterality Date    HX  SECTION       Social History     Tobacco Use    Smoking status: Never Smoker    Smokeless tobacco: Never Used   Substance Use Topics    Alcohol use: No     Frequency: Never       Family History   Problem Relation Age of Onset    Diabetes Mother     Hypertension Father        Review of Systems   Constitutional: Positive for malaise/fatigue. Negative for chills, fever and weight loss. HENT: Negative for congestion, ear discharge, ear pain, hearing loss, nosebleeds, sinus pain and sore throat. Eyes: Negative for blurred vision, double vision and discharge. Respiratory: Negative for cough. Cardiovascular: Negative for chest pain, palpitations, claudication and leg swelling. Gastrointestinal: Positive for nausea. Negative for abdominal pain, constipation, diarrhea and vomiting. Genitourinary: Positive for flank pain.  Negative for dysuria, frequency, hematuria and urgency. Musculoskeletal: Positive for back pain and myalgias. Negative for neck pain. Skin: Positive for rash. Negative for itching. Neurological: Positive for dizziness. Negative for tingling, sensory change, speech change, focal weakness, weakness and headaches. Psychiatric/Behavioral: Negative for depression, hallucinations, substance abuse and suicidal ideas. Physical Exam   Constitutional: She is oriented to person, place, and time. She appears well-developed and well-nourished. No distress. HENT:   Head: Normocephalic and atraumatic. Mouth/Throat: Oropharynx is clear and moist. No oropharyngeal exudate. Eyes: Pupils are equal, round, and reactive to light. Conjunctivae are normal. Right eye exhibits no discharge. Left eye exhibits no discharge. No scleral icterus. Neck: Normal range of motion. Neck supple. No thyromegaly present. Cardiovascular: Normal rate, regular rhythm and normal heart sounds. No murmur heard. Pulmonary/Chest: Effort normal and breath sounds normal. No respiratory distress. She has no wheezes. She has no rales. She exhibits no tenderness. Abdominal: Soft. She exhibits no distension. There is tenderness. There is no rebound. Right flank tenderness    Musculoskeletal: Normal range of motion. She exhibits no edema or deformity. Lymphadenopathy:     She has no cervical adenopathy. Neurological: She is alert and oriented to person, place, and time. No cranial nerve deficit. Coordination normal.   Skin: Skin is warm and dry. No rash noted. She is not diaphoretic. No erythema. No pallor. Keratosis pilaris  in both arms    Psychiatric: She has a normal mood and affect. Her behavior is normal. Judgment and thought content normal.   Nursing note and vitals reviewed. Assessment and plan     Plan of care has been discussed with the patient, he agrees to the plan and verbalized understanding.   All his questions were answered  More than 50% of the time spent in this visit was counseling the patient about  illness and treatment options         1. Chronic low back pain without sciatica, unspecified back pain laterality    - AMB POC URINALYSIS DIP STICK AUTO W/O MICRO  - CBC WITH AUTOMATED DIFF; Future  - CBC WITH AUTOMATED DIFF    2. Flank pain    Urinalysis did not show signs of infection on the ketones    We will wait for urine culture    - CULTURE, URINE; Future  - CBC WITH AUTOMATED DIFF; Future  - METABOLIC PANEL, COMPREHENSIVE; Future  - US RETROPERITONEUM COMP; Future  - METABOLIC PANEL, COMPREHENSIVE  - CBC WITH AUTOMATED DIFF  - CULTURE, URINE    3. Other fatigue    - C the BC WITH AUTOMATED DIFF; Future  - METABOLIC PANEL, COMPREHENSIVE; Future  - METABOLIC PANEL, COMPREHENSIVE  - CBC WITH AUTOMATED DIFF    4. Keratosis pilaris    - triamcinolone acetonide (KENALOG) 0.1 % topical cream; Apply  to affected area two (2) times a day. use thin layer only for 10 days  Dispense: 45 g; Refill: 0  - REFERRAL TO DERMATOLOGY    5. Vitamin D deficiency    - VITAMIN D, 25 HYDROXY; Future  - ergocalciferol (ERGOCALCIFEROL) 50,000 unit capsule; Take 1 Cap by mouth every seven (7) days. Dispense: 12 Cap; Refill: 1  - VITAMIN D, 25 HYDROXY    6. Nausea    - METABOLIC PANEL, COMPREHENSIVE; Future  - METABOLIC PANEL, COMPREHENSIVE    Current Outpatient Medications   Medication Sig Dispense Refill    ibuprofen (MOTRIN) 400 mg tablet Take  by mouth every six (6) hours as needed for Pain.  ergocalciferol (ERGOCALCIFEROL) 50,000 unit capsule Take 1 Cap by mouth every seven (7) days. 12 Cap 1    triamcinolone acetonide (KENALOG) 0.1 % topical cream Apply  to affected area two (2) times a day. use thin layer only for 10 days 45 g 0    meloxicam (MOBIC) 15 mg tablet Take 1 Tab by mouth daily. 30 Tab 2    tiZANidine (ZANAFLEX) 2 mg capsule Take 2 mg by mouth three (3) times daily.          Patient Active Problem List    Diagnosis Date Noted    Chronic bilateral low back pain 01/21/2019    Vitamin D deficiency 01/21/2019     Results for orders placed or performed in visit on 05/13/19   AMB POC URINALYSIS DIP STICK AUTO W/O MICRO   Result Value Ref Range    Color (UA POC) Yellow     Clarity (UA POC) Clear     Glucose (UA POC) Negative Negative    Bilirubin (UA POC) 1+ Negative    Ketones (UA POC) 1+ Negative    Specific gravity (UA POC) 1.030 1.001 - 1.035    Blood (UA POC) Negative Negative    pH (UA POC) 6.0 4.6 - 8.0    Protein (UA POC) Trace Negative    Urobilinogen (UA POC) 0.2 mg/dL 0.2 - 1    Nitrites (UA POC) Negative Negative    Leukocyte esterase (UA POC) Negative Negative     Office Visit on 05/13/2019   Component Date Value Ref Range Status    Color (UA POC) 05/13/2019 Yellow   Final    Clarity (UA POC) 05/13/2019 Clear   Final    Glucose (UA POC) 05/13/2019 Negative  Negative Final    Bilirubin (UA POC) 05/13/2019 1+  Negative Final    Ketones (UA POC) 05/13/2019 1+  Negative Final    Specific gravity (UA POC) 05/13/2019 1.030  1.001 - 1.035 Final    Blood (UA POC) 05/13/2019 Negative  Negative Final    pH (UA POC) 05/13/2019 6.0  4.6 - 8.0 Final    Protein (UA POC) 05/13/2019 Trace  Negative Final    Urobilinogen (UA POC) 05/13/2019 0.2 mg/dL  0.2 - 1 Final    Nitrites (UA POC) 05/13/2019 Negative  Negative Final    Leukocyte esterase (UA POC) 05/13/2019 Negative  Negative Final          Follow-up and Dispositions    · Return for as per reults .

## 2019-05-13 NOTE — PROGRESS NOTES
Wood Hernandez is a 43 y.o. female presents to office for low back pain    Medication list has been reviewed with Wood Hernandez and updated as of today's date     Health Maintenance items with a due date reviewed with patient:  Health Maintenance Due   Topic Date Due    PAP AKA CERVICAL CYTOLOGY  10/01/2000

## 2019-05-15 LAB
25(OH)D3+25(OH)D2 SERPL-MCNC: 21.8 NG/ML (ref 30–100)
ALBUMIN SERPL-MCNC: 4.4 G/DL (ref 3.5–5.5)
ALBUMIN/GLOB SERPL: 1.8 {RATIO} (ref 1.2–2.2)
ALP SERPL-CCNC: 53 IU/L (ref 39–117)
ALT SERPL-CCNC: 11 IU/L (ref 0–32)
AST SERPL-CCNC: 14 IU/L (ref 0–40)
BACTERIA UR CULT: NORMAL
BASOPHILS # BLD AUTO: 0 X10E3/UL (ref 0–0.2)
BASOPHILS NFR BLD AUTO: 0 %
BILIRUB SERPL-MCNC: 0.4 MG/DL (ref 0–1.2)
BUN SERPL-MCNC: 13 MG/DL (ref 6–20)
BUN/CREAT SERPL: 34 (ref 9–23)
CALCIUM SERPL-MCNC: 9.4 MG/DL (ref 8.7–10.2)
CHLORIDE SERPL-SCNC: 104 MMOL/L (ref 96–106)
CO2 SERPL-SCNC: 20 MMOL/L (ref 20–29)
CREAT SERPL-MCNC: 0.38 MG/DL (ref 0.57–1)
EOSINOPHIL # BLD AUTO: 0.1 X10E3/UL (ref 0–0.4)
EOSINOPHIL NFR BLD AUTO: 1 %
ERYTHROCYTE [DISTWIDTH] IN BLOOD BY AUTOMATED COUNT: 13.4 % (ref 12.3–15.4)
GLOBULIN SER CALC-MCNC: 2.4 G/DL (ref 1.5–4.5)
GLUCOSE SERPL-MCNC: 88 MG/DL (ref 65–99)
HCT VFR BLD AUTO: 36.8 % (ref 34–46.6)
HGB BLD-MCNC: 11.4 G/DL (ref 11.1–15.9)
IMM GRANULOCYTES # BLD AUTO: 0 X10E3/UL (ref 0–0.1)
IMM GRANULOCYTES NFR BLD AUTO: 0 %
LYMPHOCYTES # BLD AUTO: 1.8 X10E3/UL (ref 0.7–3.1)
LYMPHOCYTES NFR BLD AUTO: 25 %
MCH RBC QN AUTO: 28.4 PG (ref 26.6–33)
MCHC RBC AUTO-ENTMCNC: 31 G/DL (ref 31.5–35.7)
MCV RBC AUTO: 92 FL (ref 79–97)
MONOCYTES # BLD AUTO: 0.7 X10E3/UL (ref 0.1–0.9)
MONOCYTES NFR BLD AUTO: 9 %
NEUTROPHILS # BLD AUTO: 4.6 X10E3/UL (ref 1.4–7)
NEUTROPHILS NFR BLD AUTO: 65 %
PLATELET # BLD AUTO: 311 X10E3/UL (ref 150–379)
POTASSIUM SERPL-SCNC: 4 MMOL/L (ref 3.5–5.2)
PROT SERPL-MCNC: 6.8 G/DL (ref 6–8.5)
RBC # BLD AUTO: 4.01 X10E6/UL (ref 3.77–5.28)
SODIUM SERPL-SCNC: 140 MMOL/L (ref 134–144)
WBC # BLD AUTO: 7.2 X10E3/UL (ref 3.4–10.8)

## 2019-05-16 NOTE — PROGRESS NOTES
All results are within normal limits, urine culture is not significant for urine infection. Low vitamin D, it was 28 last time was 21!     Vitamin D 1 tablet every 1 week for 6 months, after 6 months patient need to start taking over-the-counter 5000 units of vitamin D daily    The prescription was already sent to the pharmacy at the time of the visit

## 2019-05-21 ENCOUNTER — TELEPHONE (OUTPATIENT)
Dept: FAMILY MEDICINE CLINIC | Age: 39
End: 2019-05-21

## 2019-05-21 NOTE — TELEPHONE ENCOUNTER
Spoke with patient. She states that she has not been contacted by [facility] for imaging. Refaxed documents and gave her number to central scheduling. She verbalized understanding of her lab results. Closing encounter.

## 2019-05-21 NOTE — TELEPHONE ENCOUNTER
----- Message from Barrington Hernandez MD sent at 5/16/2019  8:48 AM EDT -----  All results are within normal limits, urine culture is not significant for urine infection. Low vitamin D, it was 28 last time was 21!     Vitamin D 1 tablet every 1 week for 6 months, after 6 months patient need to start taking over-the-counter 5000 units of vitamin D daily    The prescription was already sent to the pharmacy at the time of the visit

## 2019-07-22 DIAGNOSIS — R10.9 FLANK PAIN: ICD-10-CM

## 2020-02-05 ENCOUNTER — TELEPHONE (OUTPATIENT)
Dept: FAMILY MEDICINE CLINIC | Age: 40
End: 2020-02-05

## 2020-02-05 DIAGNOSIS — G89.29 CHRONIC BILATERAL LOW BACK PAIN, UNSPECIFIED WHETHER SCIATICA PRESENT: Primary | ICD-10-CM

## 2020-02-05 DIAGNOSIS — M54.50 CHRONIC BILATERAL LOW BACK PAIN, UNSPECIFIED WHETHER SCIATICA PRESENT: Primary | ICD-10-CM

## 2020-02-05 RX ORDER — TIZANIDINE HYDROCHLORIDE 2 MG/1
2 CAPSULE, GELATIN COATED ORAL 3 TIMES DAILY
Qty: 60 CAP | Refills: 2 | Status: SHIPPED | OUTPATIENT
Start: 2020-02-05 | End: 2020-03-06

## 2020-02-05 NOTE — TELEPHONE ENCOUNTER
Please let the patient know that her medication has been refilled    And she need to schedule annual physical exam

## 2020-02-06 NOTE — TELEPHONE ENCOUNTER
Received notification that [employer] required PA. Pa submitted online through [employer]. Key is [id number]. PA APPROVED. Approvedtoday   Questionnaire submitted. PA Case [id number] Status: Approved. Authorization and Notifications Completed.

## 2020-02-26 ENCOUNTER — OFFICE VISIT (OUTPATIENT)
Dept: FAMILY MEDICINE CLINIC | Age: 40
End: 2020-02-26

## 2020-02-26 ENCOUNTER — LAB ONLY (OUTPATIENT)
Dept: FAMILY MEDICINE CLINIC | Age: 40
End: 2020-02-26

## 2020-02-26 VITALS
TEMPERATURE: 97.8 F | HEIGHT: 67 IN | DIASTOLIC BLOOD PRESSURE: 73 MMHG | SYSTOLIC BLOOD PRESSURE: 108 MMHG | HEART RATE: 95 BPM | OXYGEN SATURATION: 98 % | RESPIRATION RATE: 18 BRPM | WEIGHT: 176.2 LBS | BODY MASS INDEX: 27.65 KG/M2

## 2020-02-26 DIAGNOSIS — Z12.4 SCREENING FOR MALIGNANT NEOPLASM OF CERVIX: ICD-10-CM

## 2020-02-26 DIAGNOSIS — M25.562 LEFT KNEE PAIN, UNSPECIFIED CHRONICITY: ICD-10-CM

## 2020-02-26 DIAGNOSIS — E55.9 VITAMIN D DEFICIENCY: ICD-10-CM

## 2020-02-26 DIAGNOSIS — N64.4 BREAST PAIN, LEFT: ICD-10-CM

## 2020-02-26 DIAGNOSIS — Z00.00 ANNUAL PHYSICAL EXAM: ICD-10-CM

## 2020-02-26 DIAGNOSIS — Z00.00 ANNUAL PHYSICAL EXAM: Primary | ICD-10-CM

## 2020-02-26 NOTE — PROGRESS NOTES
Cameron Killer     Chief Complaint   Patient presents with    Physical     flu shot not available and i did inform patient that she can try her pharmacy. Vitals:    20 1343   BP: 108/73   Pulse: 95   Resp: 18   Temp: 97.8 °F (36.6 °C)   TempSrc: Oral   SpO2: 98%   Weight: 176 lb 3.2 oz (79.9 kg)   Height: 5' 7\" (1.702 m)   PainSc:   3   PainLoc: Back         HPI:Tawnya Trevino   Is here for annual physical exam , she is due for pap smear she has not had one in a few years. Has a history of questionable cyst in the left breast, she had ultrasound done in the past, but she has not followed up after she moved to [place], having pain on the left breast close to the axillary area on and off. Patient is having left knee pain after she tripped and fell few months ago, pain has improved but still she cannot flex her knee completely without pain. She still having low back pain which was diagnosed previously as muscular, she is using muscle relaxant as needed, she was referred to physical therapy but she could not attend her sessions due to busy schedule, patient will let me know when her schedule allows and then will send a referral again    History reviewed. No pertinent past medical history. Past Surgical History:   Procedure Laterality Date    HX  SECTION       Social History     Tobacco Use    Smoking status: Never Smoker    Smokeless tobacco: Never Used   Substance Use Topics    Alcohol use: No     Frequency: Never       Family History   Problem Relation Age of Onset    Diabetes Mother     Hypertension Father        Review of Systems   Constitutional: Negative for chills, fever, malaise/fatigue and weight loss. HENT: Negative for congestion, ear discharge, ear pain, hearing loss, nosebleeds, sinus pain and sore throat. Eyes: Negative for blurred vision, double vision and discharge.    Respiratory: Negative for cough, hemoptysis, sputum production and shortness of breath. Cardiovascular: Negative for chest pain, palpitations, claudication and leg swelling. Gastrointestinal: Negative for abdominal pain, constipation, diarrhea, nausea and vomiting. Genitourinary: Negative for dysuria, frequency and urgency. Musculoskeletal: Positive for back pain. Negative for falls, joint pain, myalgias and neck pain. Skin: Negative for itching and rash. Neurological: Negative for dizziness, tingling, sensory change, speech change, focal weakness, seizures, weakness and headaches. Psychiatric/Behavioral: Negative for depression and suicidal ideas. Physical Exam  Vitals signs and nursing note reviewed. Constitutional:       General: She is not in acute distress. Appearance: She is well-developed. She is not diaphoretic. HENT:      Head: Normocephalic and atraumatic. Mouth/Throat:      Pharynx: No oropharyngeal exudate. Eyes:      General: No scleral icterus. Right eye: No discharge. Left eye: No discharge. Conjunctiva/sclera: Conjunctivae normal.      Pupils: Pupils are equal, round, and reactive to light. Neck:      Thyroid: No thyromegaly. Cardiovascular:      Rate and Rhythm: Normal rate and regular rhythm. Heart sounds: Normal heart sounds. No murmur. Pulmonary:      Effort: Pulmonary effort is normal. No respiratory distress. Breath sounds: Normal breath sounds. No wheezing or rales. Chest:      Chest wall: No tenderness. Abdominal:      General: There is no distension. Palpations: Abdomen is soft. Tenderness: There is no abdominal tenderness. There is no rebound. Musculoskeletal: Normal range of motion. General: Tenderness present. No deformity. Comments: Tenderness bilaterally and the lower back      Left knee pain mainly in the lateral side   Lymphadenopathy:      Cervical: No cervical adenopathy. Skin:     General: Skin is warm and dry. Coloration: Skin is not pale. Findings: No erythema or rash. Neurological:      Mental Status: She is alert and oriented to person, place, and time. Cranial Nerves: No cranial nerve deficit. Coordination: Coordination normal.   Psychiatric:         Behavior: Behavior normal.         Thought Content: Thought content normal.         Judgment: Judgment normal.          Assessment and plan     Plan of care has been discussed with the patient, he agrees to the plan and verbalized understanding. All his questions were answered  More than 50% of the time spent in this visit was counseling the patient about  illness and treatment options         1. Annual physical exam    - CBC WITH AUTOMATED DIFF; Future  - METABOLIC PANEL, COMPREHENSIVE; Future  - LIPID PANEL; Future  - URINALYSIS W/ RFLX MICROSCOPIC; Future    2. Breast pain, left    - DRE 3D CANDIE W MAMMO BI DX INCL CAD; Future    3. Left knee pain, unspecified chronicity      4. Vitamin D deficiency    - VITAMIN D, 25 HYDROXY; Future    5. Screening for malignant neoplasm of cervix    - PAP IG, APTIMA HPV AND RFX 16/18,45 ([id number]); Future  - PAP IG, APTIMA HPV AND RFX 16/18,45 ([id number])    Current Outpatient Medications   Medication Sig Dispense Refill    ibuprofen (MOTRIN) 400 mg tablet Take  by mouth every six (6) hours as needed for Pain.  meloxicam (MOBIC) 15 mg tablet Take 1 Tab by mouth daily. 30 Tab 2    tiZANidine (ZANAFLEX) 2 mg capsule Take 1 Cap by mouth three (3) times daily for 30 days. 60 Cap 2    ergocalciferol (ERGOCALCIFEROL) 50,000 unit capsule Take 1 Cap by mouth every seven (7) days. 12 Cap 1    triamcinolone acetonide (KENALOG) 0.1 % topical cream Apply  to affected area two (2) times a day.  use thin layer only for 10 days 45 g 0       Patient Active Problem List    Diagnosis Date Noted    Chronic bilateral low back pain 01/21/2019    Vitamin D deficiency 01/21/2019     Results for orders placed or performed in visit on 05/13/19   CULTURE, URINE   Result Value Ref Range    Urine Culture, Routine       Mixed urogenital nancy  10,000-25,000 colony forming units per mL     METABOLIC PANEL, COMPREHENSIVE   Result Value Ref Range    Glucose 88 65 - 99 mg/dL    BUN 13 6 - 20 mg/dL    Creatinine 0.38 (L) 0.57 - 1.00 mg/dL    GFR est non- >59 mL/min/1.73    GFR est  >59 mL/min/1.73    BUN/Creatinine ratio 34 (H) 9 - 23    Sodium 140 134 - 144 mmol/L    Potassium 4.0 3.5 - 5.2 mmol/L    Chloride 104 96 - 106 mmol/L    CO2 20 20 - 29 mmol/L    Calcium 9.4 8.7 - 10.2 mg/dL    Protein, total 6.8 6.0 - 8.5 g/dL    Albumin 4.4 3.5 - 5.5 g/dL    GLOBULIN, TOTAL 2.4 1.5 - 4.5 g/dL    A-G Ratio 1.8 1.2 - 2.2    Bilirubin, total 0.4 0.0 - 1.2 mg/dL    Alk. phosphatase 53 39 - 117 IU/L    AST (SGOT) 14 0 - 40 IU/L    ALT (SGPT) 11 0 - 32 IU/L   VITAMIN D, 25 HYDROXY   Result Value Ref Range    VITAMIN D, 25-HYDROXY 21.8 (L) 30.0 - 100.0 ng/mL   CBC WITH AUTOMATED DIFF   Result Value Ref Range    WBC 7.2 3.4 - 10.8 x10E3/uL    RBC 4.01 3.77 - 5.28 x10E6/uL    HGB 11.4 11.1 - 15.9 g/dL    HCT 36.8 34.0 - 46.6 %    MCV 92 79 - 97 fL    MCH 28.4 26.6 - 33.0 pg    MCHC 31.0 (L) 31.5 - 35.7 g/dL    RDW 13.4 12.3 - 15.4 %    PLATELET 927 867 - 863 x10E3/uL    NEUTROPHILS 65 Not Estab. %    Lymphocytes 25 Not Estab. %    MONOCYTES 9 Not Estab. %    EOSINOPHILS 1 Not Estab. %    BASOPHILS 0 Not Estab. %    ABS. NEUTROPHILS 4.6 1.4 - 7.0 x10E3/uL    Abs Lymphocytes 1.8 0.7 - 3.1 x10E3/uL    ABS. MONOCYTES 0.7 0.1 - 0.9 x10E3/uL    ABS. EOSINOPHILS 0.1 0.0 - 0.4 x10E3/uL    ABS. BASOPHILS 0.0 0.0 - 0.2 x10E3/uL    IMMATURE GRANULOCYTES 0 Not Estab. %    ABS. IMM.  GRANS. 0.0 0.0 - 0.1 x10E3/uL   AMB POC URINALYSIS DIP STICK AUTO W/O MICRO   Result Value Ref Range    Color (UA POC) Yellow     Clarity (UA POC) Clear     Glucose (UA POC) Negative Negative    Bilirubin (UA POC) 1+ Negative    Ketones (UA POC) 1+ Negative    Specific gravity (UA POC) 1.030 1.001 - 1.035    Blood (UA POC) Negative Negative    pH (UA POC) 6.0 4.6 - 8.0    Protein (UA POC) Trace Negative    Urobilinogen (UA POC) 0.2 mg/dL 0.2 - 1    Nitrites (UA POC) Negative Negative    Leukocyte esterase (UA POC) Negative Negative     No visits with results within 3 Month(s) from this visit. Latest known visit with results is:   Office Visit on 05/13/2019   Component Date Value Ref Range Status    Color (UA POC) 05/13/2019 Yellow   Final    Clarity (UA POC) 05/13/2019 Clear   Final    Glucose (UA POC) 05/13/2019 Negative  Negative Final    Bilirubin (UA POC) 05/13/2019 1+  Negative Final    Ketones (UA POC) 05/13/2019 1+  Negative Final    Specific gravity (UA POC) 05/13/2019 1.030  1.001 - 1.035 Final    Blood (UA POC) 05/13/2019 Negative  Negative Final    pH (UA POC) 05/13/2019 6.0  4.6 - 8.0 Final    Protein (UA POC) 05/13/2019 Trace  Negative Final    Urobilinogen (UA POC) 05/13/2019 0.2 mg/dL  0.2 - 1 Final    Nitrites (UA POC) 05/13/2019 Negative  Negative Final    Leukocyte esterase (UA POC) 05/13/2019 Negative  Negative Final    Glucose 05/13/2019 88  65 - 99 mg/dL Final    BUN 05/13/2019 13  6 - 20 mg/dL Final    Creatinine 05/13/2019 0.38* 0.57 - 1.00 mg/dL Final    GFR est non-AA 05/13/2019 134  >59 mL/min/1.73 Final    GFR est AA 05/13/2019 154  >59 mL/min/1.73 Final    BUN/Creatinine ratio 05/13/2019 34* 9 - 23 Final    Sodium 05/13/2019 140  134 - 144 mmol/L Final    Potassium 05/13/2019 4.0  3.5 - 5.2 mmol/L Final    Chloride 05/13/2019 104  96 - 106 mmol/L Final    CO2 05/13/2019 20  20 - 29 mmol/L Final    Calcium 05/13/2019 9.4  8.7 - 10.2 mg/dL Final    Protein, total 05/13/2019 6.8  6.0 - 8.5 g/dL Final    Albumin 05/13/2019 4.4  3.5 - 5.5 g/dL Final    GLOBULIN, TOTAL 05/13/2019 2.4  1.5 - 4.5 g/dL Final    A-G Ratio 05/13/2019 1.8  1.2 - 2.2 Final    Bilirubin, total 05/13/2019 0.4  0.0 - 1.2 mg/dL Final    Alk.  phosphatase 05/13/2019 53  39 - 117 IU/L Final    AST (SGOT) 05/13/2019 14  0 - 40 IU/L Final    ALT (SGPT) 05/13/2019 11  0 - 32 IU/L Final    VITAMIN D, 25-HYDROXY 05/13/2019 21.8* 30.0 - 100.0 ng/mL Final    Comment: Vitamin D deficiency has been defined by the [facility] practice guideline as a  level of serum 25-OH vitamin D less than 20 ng/mL (1,2). The Endocrine Society went on to further define vitamin D  insufficiency as a level between 21 and 29 ng/mL (2). 1. IOM ([place] of Medicine). 2010. Dietary reference     intakes for calcium and D. [facility]: The     [employer]. 2. Shreyas MF, Suraj NC, Aniceto BATRES, et al.     Evaluation, treatment, and prevention of vitamin D     deficiency: an Endocrine Society clinical practice     guideline. JCEM. 2011 Jul; 96(7):1911-30.  WBC 05/13/2019 7.2  3.4 - 10.8 x10E3/uL Final    RBC 05/13/2019 4.01  3.77 - 5.28 x10E6/uL Final    HGB 05/13/2019 11.4  11.1 - 15.9 g/dL Final    HCT 05/13/2019 36.8  34.0 - 46.6 % Final    MCV 05/13/2019 92  79 - 97 fL Final    MCH 05/13/2019 28.4  26.6 - 33.0 pg Final    MCHC 05/13/2019 31.0* 31.5 - 35.7 g/dL Final    RDW 05/13/2019 13.4  12.3 - 15.4 % Final    PLATELET 96/08/4084 838  150 - 379 x10E3/uL Final    NEUTROPHILS 05/13/2019 65  Not Estab. % Final    Lymphocytes 05/13/2019 25  Not Estab. % Final    MONOCYTES 05/13/2019 9  Not Estab. % Final    EOSINOPHILS 05/13/2019 1  Not Estab. % Final    BASOPHILS 05/13/2019 0  Not Estab. % Final    ABS. NEUTROPHILS 05/13/2019 4.6  1.4 - 7.0 x10E3/uL Final    Abs Lymphocytes 05/13/2019 1.8  0.7 - 3.1 x10E3/uL Final    ABS. MONOCYTES 05/13/2019 0.7  0.1 - 0.9 x10E3/uL Final    ABS. EOSINOPHILS 05/13/2019 0.1  0.0 - 0.4 x10E3/uL Final    ABS. BASOPHILS 05/13/2019 0.0  0.0 - 0.2 x10E3/uL Final    IMMATURE GRANULOCYTES 05/13/2019 0  Not Estab. % Final    ABS. IMM.  GRANS. 05/13/2019 0.0  0.0 - 0.1 x10E3/uL Final    Urine Culture, Routine 05/13/2019    Final Value: Mixed urogenital nancy  10,000-25,000 colony forming units per mL            Follow-up and Dispositions    · Return in about 3 months (around 5/26/2020) for as per results.

## 2020-02-26 NOTE — PROGRESS NOTES
Sera Rosas is a 35 y.o. female (: 1980) presenting to address:    Chief Complaint   Patient presents with    Physical     flu shot not available and i did inform patient that she can try her pharmacy. Vitals:    20 1343   BP: 108/73   Pulse: 95   Resp: 18   Temp: 97.8 °F (36.6 °C)   TempSrc: Oral   SpO2: 98%   Weight: 176 lb 3.2 oz (79.9 kg)   Height: 5' 7\" (1.702 m)   PainSc:   3   PainLoc: Back       Hearing/Vision:      Visual Acuity Screening    Right eye Left eye Both eyes   Without correction:      With correction: 20/30 20/30 20/40       Learning Assessment:     Learning Assessment 2020   PRIMARY LEARNER Patient   HIGHEST LEVEL OF EDUCATION - PRIMARY LEARNER  4 YEARS OF COLLEGE   BARRIERS PRIMARY LEARNER NONE   CO-LEARNER CAREGIVER No   PRIMARY LANGUAGE OTHER (COMMENT)   LEARNER PREFERENCE PRIMARY READING   ANSWERED BY patient   RELATIONSHIP SELF     Depression Screening:     3 most recent PHQ Screens 2020   Little interest or pleasure in doing things Not at all   Feeling down, depressed, irritable, or hopeless Not at all   Total Score PHQ 2 0     Fall Risk Assessment:     Fall Risk Assessment, last 12 mths 2018   Able to walk? Yes   Fall in past 12 months? No     Abuse Screening:     Abuse Screening Questionnaire 2020   Do you ever feel afraid of your partner? N   Are you in a relationship with someone who physically or mentally threatens you? N   Is it safe for you to go home? Y     Coordination of Care Questionaire:   1. Have you been to the ER, urgent care clinic since your last visit? Hospitalized since your last visit? NO    2. Have you seen or consulted any other health care providers outside of the [address] [place] [address] since your last visit? Include any pap smears or colon screening. NO    Advanced Directive:   1. Do you have an Advanced Directive? NO    2. Would you like information on Advanced Directives?  NO

## 2020-02-27 LAB
25(OH)D3+25(OH)D2 SERPL-MCNC: 21.3 NG/ML (ref 30–100)
ALBUMIN SERPL-MCNC: 4.1 G/DL (ref 3.8–4.8)
ALBUMIN/GLOB SERPL: 1.5 {RATIO} (ref 1.2–2.2)
ALP SERPL-CCNC: 54 IU/L (ref 39–117)
ALT SERPL-CCNC: 16 IU/L (ref 0–32)
APPEARANCE UR: CLEAR
AST SERPL-CCNC: 18 IU/L (ref 0–40)
BASOPHILS # BLD AUTO: 0 X10E3/UL (ref 0–0.2)
BASOPHILS NFR BLD AUTO: 1 %
BILIRUB SERPL-MCNC: 0.4 MG/DL (ref 0–1.2)
BILIRUB UR QL STRIP: NEGATIVE
BUN SERPL-MCNC: 14 MG/DL (ref 6–24)
BUN/CREAT SERPL: 27 (ref 9–23)
CALCIUM SERPL-MCNC: 9.7 MG/DL (ref 8.7–10.2)
CHLORIDE SERPL-SCNC: 101 MMOL/L (ref 96–106)
CHOLEST SERPL-MCNC: 127 MG/DL (ref 100–199)
CO2 SERPL-SCNC: 26 MMOL/L (ref 20–29)
COLOR UR: YELLOW
CREAT SERPL-MCNC: 0.52 MG/DL (ref 0.57–1)
EOSINOPHIL # BLD AUTO: 0.1 X10E3/UL (ref 0–0.4)
EOSINOPHIL NFR BLD AUTO: 1 %
ERYTHROCYTE [DISTWIDTH] IN BLOOD BY AUTOMATED COUNT: 12 % (ref 11.7–15.4)
GLOBULIN SER CALC-MCNC: 2.7 G/DL (ref 1.5–4.5)
GLUCOSE SERPL-MCNC: 73 MG/DL (ref 65–99)
GLUCOSE UR QL: NEGATIVE
HCT VFR BLD AUTO: 37.1 % (ref 34–46.6)
HDLC SERPL-MCNC: 48 MG/DL
HGB BLD-MCNC: 12.3 G/DL (ref 11.1–15.9)
HGB UR QL STRIP: NEGATIVE
IMM GRANULOCYTES # BLD AUTO: 0 X10E3/UL (ref 0–0.1)
IMM GRANULOCYTES NFR BLD AUTO: 0 %
INTERPRETATION, 910389: NORMAL
KETONES UR QL STRIP: ABNORMAL
LDLC SERPL CALC-MCNC: 69 MG/DL (ref 0–99)
LEUKOCYTE ESTERASE UR QL STRIP: NEGATIVE
LYMPHOCYTES # BLD AUTO: 1.7 X10E3/UL (ref 0.7–3.1)
LYMPHOCYTES NFR BLD AUTO: 20 %
MCH RBC QN AUTO: 29.9 PG (ref 26.6–33)
MCHC RBC AUTO-ENTMCNC: 33.2 G/DL (ref 31.5–35.7)
MCV RBC AUTO: 90 FL (ref 79–97)
MICRO URNS: ABNORMAL
MONOCYTES # BLD AUTO: 0.7 X10E3/UL (ref 0.1–0.9)
MONOCYTES NFR BLD AUTO: 9 %
NEUTROPHILS # BLD AUTO: 5.9 X10E3/UL (ref 1.4–7)
NEUTROPHILS NFR BLD AUTO: 69 %
NITRITE UR QL STRIP: NEGATIVE
PH UR STRIP: 6 [PH] (ref 5–7.5)
PLATELET # BLD AUTO: 320 X10E3/UL (ref 150–450)
POTASSIUM SERPL-SCNC: 4.2 MMOL/L (ref 3.5–5.2)
PROT SERPL-MCNC: 6.8 G/DL (ref 6–8.5)
PROT UR QL STRIP: NEGATIVE
RBC # BLD AUTO: 4.11 X10E6/UL (ref 3.77–5.28)
SODIUM SERPL-SCNC: 141 MMOL/L (ref 134–144)
SP GR UR: 1.02 (ref 1–1.03)
TRIGL SERPL-MCNC: 48 MG/DL (ref 0–149)
UROBILINOGEN UR STRIP-MCNC: 1 MG/DL (ref 0.2–1)
VLDLC SERPL CALC-MCNC: 10 MG/DL (ref 5–40)
WBC # BLD AUTO: 8.5 X10E3/UL (ref 3.4–10.8)

## 2020-02-27 RX ORDER — ERGOCALCIFEROL 1.25 MG/1
50000 CAPSULE ORAL
Qty: 12 CAP | Refills: 1 | Status: SHIPPED | OUTPATIENT
Start: 2020-02-27 | End: 2020-06-03

## 2020-02-27 NOTE — PROGRESS NOTES
Low vitamin D patient need to take vitamin D supplements and prescription pharmacy 50,000 unit weekly if patient having difficulty committing to the weekly dose she can take over-the-counter 5000 units daily.     Urinalysis shows some ketones which indicate that there is no enough water intake patient will need to drink water at least 3 to 4 L daily    Otherwise all her labs are normal normal cholesterol panel normal liver and kidney function normal hemoglobin

## 2020-03-03 ENCOUNTER — TELEPHONE (OUTPATIENT)
Dept: FAMILY MEDICINE CLINIC | Age: 40
End: 2020-03-03

## 2020-03-03 DIAGNOSIS — N64.4 BREAST PAIN, RIGHT: ICD-10-CM

## 2020-03-03 DIAGNOSIS — Z12.39 SCREENING FOR BREAST CANCER: Primary | ICD-10-CM

## 2020-03-03 LAB
CYTOLOGIST CVX/VAG CYTO: NORMAL
CYTOLOGY CVX/VAG DOC CYTO: NORMAL
CYTOLOGY CVX/VAG DOC THIN PREP: NORMAL
DX ICD CODE: NORMAL
HPV I/H RISK 4 DNA CVX QL PROBE+SIG AMP: NEGATIVE
Lab: NORMAL
OTHER STN SPEC: NORMAL
STAT OF ADQ CVX/VAG CYTO-IMP: NORMAL

## 2020-03-03 NOTE — TELEPHONE ENCOUNTER
Received fax that patient needs a diagnostic order for mammogram to go along with mammo per radiology.  Order pended for diagnostic mammo

## 2020-03-17 ENCOUNTER — HOSPITAL ENCOUNTER (OUTPATIENT)
Dept: MAMMOGRAPHY | Age: 40
Discharge: HOME | End: 2020-03-17
Attending: LEGAL MEDICINE
Payer: MEDICAID

## 2020-03-17 ENCOUNTER — HOSPITAL ENCOUNTER (OUTPATIENT)
Dept: ULTRASOUND IMAGING | Age: 40
Discharge: HOME | End: 2020-03-17
Attending: LEGAL MEDICINE
Payer: MEDICAID

## 2020-03-17 DIAGNOSIS — Z12.39 SCREENING FOR BREAST CANCER: ICD-10-CM

## 2020-03-17 DIAGNOSIS — N64.4 BREAST PAIN, RIGHT: ICD-10-CM

## 2020-03-17 PROCEDURE — 76642 ULTRASOUND BREAST LIMITED: CPT

## 2020-03-17 PROCEDURE — 77062 BREAST TOMOSYNTHESIS BI: CPT

## 2020-06-03 DIAGNOSIS — E55.9 VITAMIN D DEFICIENCY: ICD-10-CM

## 2020-06-03 RX ORDER — ERGOCALCIFEROL 1.25 MG/1
50000 CAPSULE ORAL
Qty: 12 CAP | Refills: 3 | Status: SHIPPED | OUTPATIENT
Start: 2020-06-03 | End: 2020-12-30 | Stop reason: SDUPTHER

## 2020-12-22 ENCOUNTER — TELEPHONE (OUTPATIENT)
Dept: FAMILY MEDICINE CLINIC | Age: 40
End: 2020-12-22

## 2020-12-30 ENCOUNTER — VIRTUAL VISIT (OUTPATIENT)
Dept: FAMILY MEDICINE CLINIC | Age: 40
End: 2020-12-30
Payer: MEDICAID

## 2020-12-30 DIAGNOSIS — G89.29 CHRONIC LOW BACK PAIN WITHOUT SCIATICA, UNSPECIFIED BACK PAIN LATERALITY: Primary | ICD-10-CM

## 2020-12-30 DIAGNOSIS — E55.9 VITAMIN D DEFICIENCY: ICD-10-CM

## 2020-12-30 DIAGNOSIS — Z30.431 IUD CHECK UP: ICD-10-CM

## 2020-12-30 DIAGNOSIS — M54.9 CHRONIC UPPER BACK PAIN: ICD-10-CM

## 2020-12-30 DIAGNOSIS — M54.50 CHRONIC LOW BACK PAIN WITHOUT SCIATICA, UNSPECIFIED BACK PAIN LATERALITY: Primary | ICD-10-CM

## 2020-12-30 DIAGNOSIS — G89.29 CHRONIC UPPER BACK PAIN: ICD-10-CM

## 2020-12-30 DIAGNOSIS — M62.830 MUSCLE SPASM OF BACK: ICD-10-CM

## 2020-12-30 PROCEDURE — 99214 OFFICE O/P EST MOD 30 MIN: CPT | Performed by: LEGAL MEDICINE

## 2020-12-30 RX ORDER — MELOXICAM 15 MG/1
15 TABLET ORAL DAILY
Qty: 30 TAB | Refills: 3 | Status: SHIPPED | OUTPATIENT
Start: 2020-12-30 | End: 2021-01-29

## 2020-12-30 RX ORDER — ERGOCALCIFEROL 1.25 MG/1
50000 CAPSULE ORAL
Qty: 12 CAP | Refills: 3 | Status: SHIPPED | OUTPATIENT
Start: 2020-12-30 | End: 2021-08-06 | Stop reason: SDUPTHER

## 2020-12-30 RX ORDER — CYCLOBENZAPRINE HCL 10 MG
10 TABLET ORAL
Qty: 270 TAB | Refills: 2 | Status: SHIPPED | OUTPATIENT
Start: 2020-12-30 | End: 2021-03-30

## 2020-12-30 NOTE — PROGRESS NOTES
Janna Funes is a 38 y.o. 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 Chronic low back pain without sciatica, unspecified back pain laterality  -     REFERRAL TO PHYSICAL THERAPY  -     meloxicam (MOBIC) 15 mg tablet; Take 1 Tab by mouth daily for 30 days. -     XR SPINE LUMB 2 OR 3 V; Future    2. Muscle spasm of back  -     REFERRAL TO PHYSICAL THERAPY  -     cyclobenzaprine (FLEXERIL) 10 mg tablet; Take 1 Tab by mouth three (3) times daily (with meals) for 90 days. 3. IUD check up  -     REFERRAL TO OBSTETRICS AND GYNECOLOGY    4. Vitamin D deficiency  -     ergocalciferol (Vitamin D2) 1,250 mcg (50,000 unit) capsule; Take 1 Cap by mouth every seven (7) days for 90 days. 5. Chronic upper back pain  -     XR SPINE THORAC 3 V; Future          712  Subjective:       Prior to Admission medications    Medication Sig Start Date End Date Taking? Authorizing Provider   cyclobenzaprine (FLEXERIL) 10 mg tablet Take 1 Tab by mouth three (3) times daily (with meals) for 90 days.  12/30/20 3/30/21 Yes Josiane Black MD   ergocalciferol (Vitamin D2) 1,250 mcg (50,000 unit) capsule Take 1 Cap by mouth every seven (7) days for 90 days. 12/30/20 3/30/21 Yes Joyce Madrid MD   meloxicam (MOBIC) 15 mg tablet Take 1 Tab by mouth daily for 30 days. 20 Yes Joyce Madrid MD   ergocalciferol (Vitamin D2) 1,250 mcg (50,000 unit) capsule Take 1 Cap by mouth every seven (7) days for 90 days. 6/3/20 12/30/20  Joyce Madrid MD   ibuprofen (MOTRIN) 400 mg tablet Take  by mouth every six (6) hours as needed for Pain. 20  Provider, Historical   meloxicam (MOBIC) 15 mg tablet Take 1 Tab by mouth daily. 18  Joyce Madrid MD     Patient Active Problem List   Diagnosis Code    Chronic bilateral low back pain M54.5, G89.29    Vitamin D deficiency E55.9     Patient Active Problem List    Diagnosis Date Noted    Chronic bilateral low back pain 2019    Vitamin D deficiency 2019     Current Outpatient Medications   Medication Sig Dispense Refill    cyclobenzaprine (FLEXERIL) 10 mg tablet Take 1 Tab by mouth three (3) times daily (with meals) for 90 days. 270 Tab 2    ergocalciferol (Vitamin D2) 1,250 mcg (50,000 unit) capsule Take 1 Cap by mouth every seven (7) days for 90 days. 12 Cap 3    meloxicam (MOBIC) 15 mg tablet Take 1 Tab by mouth daily for 30 days. 30 Tab 3     No Known Allergies  No past medical history on file. Past Surgical History:   Procedure Laterality Date    HX  SECTION       Family History   Problem Relation Age of Onset    Diabetes Mother     Hypertension Father      Social History     Tobacco Use    Smoking status: Never Smoker    Smokeless tobacco: Never Used   Substance Use Topics    Alcohol use: No     Frequency: Never       Review of Systems   Constitutional: Negative for chills, fever, malaise/fatigue and weight loss. HENT: Negative for congestion, ear discharge, ear pain, hearing loss, nosebleeds, sinus pain and sore throat. Eyes: Negative for blurred vision, double vision and discharge. Respiratory: Negative for cough. Cardiovascular: Negative for chest pain, palpitations, claudication and leg swelling. Gastrointestinal: Negative for abdominal pain, constipation, diarrhea, nausea and vomiting. Genitourinary: Negative for dysuria, frequency and urgency. Musculoskeletal: Positive for back pain and myalgias. Negative for joint pain and neck pain. Skin: Negative for itching and rash. Neurological: Negative for dizziness, tingling, sensory change, speech change, focal weakness, weakness and headaches. Psychiatric/Behavioral: Negative for depression and suicidal ideas. Objective:   No flowsheet data found. General: alert, cooperative, no distress   Mental  status: normal mood, behavior, speech, dress, motor activity, and thought processes, able to follow commands   HENT: NCAT   Neck: no visualized mass   Resp: no respiratory distress   Neuro: no gross deficits   Skin: no discoloration or lesions of concern on visible areas   Psychiatric: normal affect, consistent with stated mood, no evidence of hallucinations     Additional exam findings: We discussed the expected course, resolution and complications of the diagnosis(es) in detail. Medication risks, benefits, costs, interactions, and alternatives were discussed as indicated. I advised her to contact the office if her condition worsens, changes or fails to improve as anticipated. She expressed understanding with the diagnosis(es) and plan. Lyn Valladares, who was evaluated through a patient-initiated, synchronous (real-time) audio-video encounter, and/or her healthcare decision maker, is aware that it is a billable service, with coverage as determined by her insurance carrier. She provided verbal consent to proceed: Yes, and patient identification was verified.  It was conducted pursuant to the emergency declaration under the [facility], [facility] waiver authority and the Coronavirus Preparedness and Response Supplemental Appropriations Act. A caregiver was present when appropriate. Ability to conduct physical exam was limited. I was in the office. The patient was at home.       Rhona Beck MD

## 2021-01-22 ENCOUNTER — APPOINTMENT (OUTPATIENT)
Dept: PHYSICAL THERAPY | Age: 41
End: 2021-01-22

## 2021-01-29 ENCOUNTER — APPOINTMENT (OUTPATIENT)
Dept: PHYSICAL THERAPY | Age: 41
End: 2021-01-29

## 2021-02-01 ENCOUNTER — APPOINTMENT (OUTPATIENT)
Dept: PHYSICAL THERAPY | Age: 41
End: 2021-02-01

## 2021-07-26 ENCOUNTER — TELEPHONE (OUTPATIENT)
Dept: FAMILY MEDICINE CLINIC | Age: 41
End: 2021-07-26

## 2021-07-26 DIAGNOSIS — R53.83 FATIGUE, UNSPECIFIED TYPE: ICD-10-CM

## 2021-07-26 DIAGNOSIS — R79.89 ABNORMAL TSH: ICD-10-CM

## 2021-07-26 DIAGNOSIS — E55.9 VITAMIN D DEFICIENCY: ICD-10-CM

## 2021-07-26 DIAGNOSIS — M62.830 MUSCLE SPASM OF BACK: ICD-10-CM

## 2021-07-26 DIAGNOSIS — Z00.00 ANNUAL PHYSICAL EXAM: Primary | ICD-10-CM

## 2021-07-26 NOTE — TELEPHONE ENCOUNTER
Attempted to reach patient to schedule; per dr Jessica Conroy its okay to schedule this patient in a same day slot for 30 mins however labs need to be drawn prior to appointment

## 2021-08-03 ENCOUNTER — TELEPHONE (OUTPATIENT)
Dept: FAMILY MEDICINE CLINIC | Age: 41
End: 2021-08-03

## 2021-08-03 ENCOUNTER — APPOINTMENT (OUTPATIENT)
Dept: FAMILY MEDICINE CLINIC | Age: 41
End: 2021-08-03

## 2021-08-03 DIAGNOSIS — R53.83 FATIGUE, UNSPECIFIED TYPE: ICD-10-CM

## 2021-08-03 DIAGNOSIS — Z00.00 ANNUAL PHYSICAL EXAM: ICD-10-CM

## 2021-08-03 DIAGNOSIS — Z00.00 ANNUAL PHYSICAL EXAM: Primary | ICD-10-CM

## 2021-08-03 DIAGNOSIS — Z13.1 SCREENING FOR DIABETES MELLITUS (DM): ICD-10-CM

## 2021-08-03 DIAGNOSIS — Z83.3 FAMILY HISTORY OF DIABETES MELLITUS: ICD-10-CM

## 2021-08-03 DIAGNOSIS — E55.9 VITAMIN D DEFICIENCY: ICD-10-CM

## 2021-08-03 DIAGNOSIS — R79.89 ABNORMAL TSH: ICD-10-CM

## 2021-08-04 LAB
25(OH)D3+25(OH)D2 SERPL-MCNC: 21.7 NG/ML (ref 30–100)
ALBUMIN SERPL-MCNC: 4.1 G/DL (ref 3.8–4.8)
ALBUMIN/GLOB SERPL: 1.6 {RATIO} (ref 1.2–2.2)
ALP SERPL-CCNC: 56 IU/L (ref 48–121)
ALT SERPL-CCNC: 7 IU/L (ref 0–32)
APPEARANCE UR: CLEAR
AST SERPL-CCNC: 10 IU/L (ref 0–40)
BACTERIA #/AREA URNS HPF: NORMAL /[HPF]
BASOPHILS # BLD AUTO: 0 X10E3/UL (ref 0–0.2)
BASOPHILS NFR BLD AUTO: 0 %
BILIRUB SERPL-MCNC: 0.4 MG/DL (ref 0–1.2)
BILIRUB UR QL STRIP: NEGATIVE
BUN SERPL-MCNC: 10 MG/DL (ref 6–24)
BUN/CREAT SERPL: 22 (ref 9–23)
CALCIUM SERPL-MCNC: 9.3 MG/DL (ref 8.7–10.2)
CASTS URNS QL MICRO: NORMAL /LPF
CHLORIDE SERPL-SCNC: 104 MMOL/L (ref 96–106)
CHOLEST SERPL-MCNC: 125 MG/DL (ref 100–199)
CO2 SERPL-SCNC: 24 MMOL/L (ref 20–29)
COLOR UR: YELLOW
CREAT SERPL-MCNC: 0.45 MG/DL (ref 0.57–1)
EOSINOPHIL # BLD AUTO: 0.1 X10E3/UL (ref 0–0.4)
EOSINOPHIL NFR BLD AUTO: 1 %
EPI CELLS #/AREA URNS HPF: NORMAL /HPF (ref 0–10)
ERYTHROCYTE [DISTWIDTH] IN BLOOD BY AUTOMATED COUNT: 11.7 % (ref 11.7–15.4)
GLOBULIN SER CALC-MCNC: 2.5 G/DL (ref 1.5–4.5)
GLUCOSE SERPL-MCNC: 100 MG/DL (ref 65–99)
GLUCOSE UR QL: NEGATIVE
HBA1C MFR BLD: 4.6 % (ref 4.8–5.6)
HCT VFR BLD AUTO: 35.2 % (ref 34–46.6)
HDLC SERPL-MCNC: 45 MG/DL
HGB BLD-MCNC: 11.7 G/DL (ref 11.1–15.9)
HGB UR QL STRIP: ABNORMAL
IMM GRANULOCYTES # BLD AUTO: 0 X10E3/UL (ref 0–0.1)
IMM GRANULOCYTES NFR BLD AUTO: 0 %
IMP & REVIEW OF LAB RESULTS: NORMAL
KETONES UR QL STRIP: NEGATIVE
LDLC SERPL CALC-MCNC: 71 MG/DL (ref 0–99)
LEUKOCYTE ESTERASE UR QL STRIP: NEGATIVE
LYMPHOCYTES # BLD AUTO: 1.7 X10E3/UL (ref 0.7–3.1)
LYMPHOCYTES NFR BLD AUTO: 21 %
MCH RBC QN AUTO: 29.7 PG (ref 26.6–33)
MCHC RBC AUTO-ENTMCNC: 33.2 G/DL (ref 31.5–35.7)
MCV RBC AUTO: 89 FL (ref 79–97)
MICRO URNS: ABNORMAL
MONOCYTES # BLD AUTO: 0.6 X10E3/UL (ref 0.1–0.9)
MONOCYTES NFR BLD AUTO: 7 %
NEUTROPHILS # BLD AUTO: 6 X10E3/UL (ref 1.4–7)
NEUTROPHILS NFR BLD AUTO: 71 %
NITRITE UR QL STRIP: NEGATIVE
PH UR STRIP: 5.5 [PH] (ref 5–7.5)
PLATELET # BLD AUTO: 297 X10E3/UL (ref 150–450)
POTASSIUM SERPL-SCNC: 3.8 MMOL/L (ref 3.5–5.2)
PROT SERPL-MCNC: 6.6 G/DL (ref 6–8.5)
PROT UR QL STRIP: NEGATIVE
RBC # BLD AUTO: 3.94 X10E6/UL (ref 3.77–5.28)
RBC #/AREA URNS HPF: NORMAL /HPF (ref 0–2)
SODIUM SERPL-SCNC: 139 MMOL/L (ref 134–144)
SP GR UR: 1.02 (ref 1–1.03)
T4 FREE SERPL-MCNC: 1.21 NG/DL (ref 0.82–1.77)
TRIGL SERPL-MCNC: 33 MG/DL (ref 0–149)
TSH SERPL DL<=0.005 MIU/L-ACNC: 1.44 UIU/ML (ref 0.45–4.5)
UROBILINOGEN UR STRIP-MCNC: 0.2 MG/DL (ref 0.2–1)
VLDLC SERPL CALC-MCNC: 9 MG/DL (ref 5–40)
WBC # BLD AUTO: 8.4 X10E3/UL (ref 3.4–10.8)
WBC #/AREA URNS HPF: NORMAL /HPF (ref 0–5)

## 2021-08-06 ENCOUNTER — OFFICE VISIT (OUTPATIENT)
Dept: FAMILY MEDICINE CLINIC | Age: 41
End: 2021-08-06
Payer: MEDICAID

## 2021-08-06 VITALS
SYSTOLIC BLOOD PRESSURE: 101 MMHG | HEART RATE: 89 BPM | OXYGEN SATURATION: 100 % | HEIGHT: 68 IN | RESPIRATION RATE: 16 BRPM | TEMPERATURE: 98.1 F | BODY MASS INDEX: 26.07 KG/M2 | DIASTOLIC BLOOD PRESSURE: 71 MMHG | WEIGHT: 172 LBS

## 2021-08-06 DIAGNOSIS — Z00.00 ANNUAL PHYSICAL EXAM: Primary | ICD-10-CM

## 2021-08-06 DIAGNOSIS — G89.29 CHRONIC BILATERAL LOW BACK PAIN WITH BILATERAL SCIATICA: ICD-10-CM

## 2021-08-06 DIAGNOSIS — E55.9 VITAMIN D DEFICIENCY: ICD-10-CM

## 2021-08-06 DIAGNOSIS — M54.6 CHRONIC BILATERAL THORACIC BACK PAIN: ICD-10-CM

## 2021-08-06 DIAGNOSIS — M62.838 MUSCLE SPASM: ICD-10-CM

## 2021-08-06 DIAGNOSIS — M54.42 CHRONIC BILATERAL LOW BACK PAIN WITH BILATERAL SCIATICA: ICD-10-CM

## 2021-08-06 DIAGNOSIS — K12.2 BACTERIAL SKIN INFECTION OF MOUTH: ICD-10-CM

## 2021-08-06 DIAGNOSIS — G89.29 CHRONIC BILATERAL THORACIC BACK PAIN: ICD-10-CM

## 2021-08-06 DIAGNOSIS — M54.41 CHRONIC BILATERAL LOW BACK PAIN WITH BILATERAL SCIATICA: ICD-10-CM

## 2021-08-06 PROCEDURE — 99396 PREV VISIT EST AGE 40-64: CPT | Performed by: LEGAL MEDICINE

## 2021-08-06 RX ORDER — MELOXICAM 15 MG/1
15 TABLET ORAL
Qty: 90 TABLET | Refills: 1 | Status: SHIPPED | OUTPATIENT
Start: 2021-08-06 | End: 2021-11-04

## 2021-08-06 RX ORDER — ERGOCALCIFEROL 1.25 MG/1
50000 CAPSULE ORAL
Qty: 12 CAPSULE | Refills: 3 | Status: SHIPPED | OUTPATIENT
Start: 2021-08-06 | End: 2022-05-26 | Stop reason: SDUPTHER

## 2021-08-06 RX ORDER — TIZANIDINE 2 MG/1
2 TABLET ORAL
Qty: 90 TABLET | Refills: 0 | Status: SHIPPED | OUTPATIENT
Start: 2021-08-06 | End: 2021-09-05

## 2021-08-06 RX ORDER — CLINDAMYCIN HYDROCHLORIDE 150 MG/1
150 CAPSULE ORAL EVERY 6 HOURS
Qty: 28 CAPSULE | Refills: 0 | Status: SHIPPED | OUTPATIENT
Start: 2021-08-06 | End: 2021-08-13
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
## 2021-08-06 NOTE — PROGRESS NOTES
Brandon Jimenez is a 38 y.o. female (: 1980) presenting to address:    Chief Complaint   Patient presents with    Complete Physical       Vitals:    21 1316   BP: 101/71   Pulse: 89   Resp: 16   Temp: 98.1 °F (36.7 °C)   TempSrc: Temporal   SpO2: 100%   Weight: 172 lb (78 kg)   Height: 5' 8\" (1.727 m)       Hearing/Vision:   No exam data present    Learning Assessment:     Learning Assessment 2020   PRIMARY LEARNER Patient   HIGHEST LEVEL OF EDUCATION - PRIMARY LEARNER  4 YEARS OF COLLEGE   BARRIERS PRIMARY LEARNER NONE   CO-LEARNER CAREGIVER No   PRIMARY LANGUAGE OTHER (COMMENT)   LEARNER PREFERENCE PRIMARY READING   ANSWERED BY patient   RELATIONSHIP SELF     Depression Screening:     3 most recent PHQ Screens 2021   Little interest or pleasure in doing things Not at all   Feeling down, depressed, irritable, or hopeless Not at all   Total Score PHQ 2 0     Fall Risk Assessment:     Fall Risk Assessment, last 12 mths 2018   Able to walk? Yes   Fall in past 12 months? No     Abuse Screening:     Abuse Screening Questionnaire 2021   Do you ever feel afraid of your partner? N   Are you in a relationship with someone who physically or mentally threatens you? N   Is it safe for you to go home? Y     Coordination of Care Questionaire:   1. Have you been to the ER, urgent care clinic since your last visit? Hospitalized since your last visit? Yes [facility] 6 months ago for Covid    2. Have you seen or consulted any other health care providers outside of the [address] [place] [address] since your last visit? Include any pap smears or colon screening. no    Advanced Directive:   1. Do you have an Advanced Directive? NO    2. Would you like information on Advanced Directives?  NO

## 2021-08-17 ENCOUNTER — OFFICE VISIT (OUTPATIENT)
Dept: FAMILY MEDICINE CLINIC | Age: 41
End: 2021-08-17
Payer: MEDICAID

## 2021-08-17 VITALS
SYSTOLIC BLOOD PRESSURE: 96 MMHG | HEART RATE: 73 BPM | WEIGHT: 172 LBS | RESPIRATION RATE: 16 BRPM | TEMPERATURE: 98.3 F | BODY MASS INDEX: 26.07 KG/M2 | DIASTOLIC BLOOD PRESSURE: 68 MMHG | HEIGHT: 68 IN | OXYGEN SATURATION: 100 %

## 2021-08-17 DIAGNOSIS — M41.80 ROTOSCOLIOSIS: ICD-10-CM

## 2021-08-17 DIAGNOSIS — M54.6 CHRONIC BILATERAL THORACIC BACK PAIN: ICD-10-CM

## 2021-08-17 DIAGNOSIS — M40.00 KYPHOSIS (ACQUIRED) (POSTURAL): ICD-10-CM

## 2021-08-17 DIAGNOSIS — M51.36 DDD (DEGENERATIVE DISC DISEASE), LUMBAR: ICD-10-CM

## 2021-08-17 DIAGNOSIS — G89.29 CHRONIC LOW BACK PAIN, UNSPECIFIED BACK PAIN LATERALITY, UNSPECIFIED WHETHER SCIATICA PRESENT: Primary | ICD-10-CM

## 2021-08-17 DIAGNOSIS — M50.30 DDD (DEGENERATIVE DISC DISEASE), CERVICAL: ICD-10-CM

## 2021-08-17 DIAGNOSIS — M54.50 CHRONIC LOW BACK PAIN, UNSPECIFIED BACK PAIN LATERALITY, UNSPECIFIED WHETHER SCIATICA PRESENT: Primary | ICD-10-CM

## 2021-08-17 DIAGNOSIS — Z12.31 ENCOUNTER FOR SCREENING MAMMOGRAM FOR BREAST CANCER: ICD-10-CM

## 2021-08-17 DIAGNOSIS — G89.29 CHRONIC BILATERAL THORACIC BACK PAIN: ICD-10-CM

## 2021-08-17 PROCEDURE — 99214 OFFICE O/P EST MOD 30 MIN: CPT | Performed by: LEGAL MEDICINE

## 2021-08-17 NOTE — PROGRESS NOTES
Renata Shannon     Chief Complaint   Patient presents with    Results     follow up x ray results     Vitals:    21 1046   BP: 96/68   Pulse: 73   Resp: 16   Temp: 98.3 °F (36.8 °C)   TempSrc: Temporal   SpO2: 100%   Weight: 172 lb (78 kg)   Height: 5' 8\" (1.727 m)         HPI: Patient here for follow-up on back pain after x-ray results  She has chronic low back pain and an upper back pain as well and neck pain  No radiation of her pain has no weakness  X-ray of the thoracic spine showed  Mild-to-moderate degenerative disc disease of the cervical spine partially  evaluated most pronounced within the lower cervical levels.     No significant osseous degenerative disc disease of the thoracic spine. Trace  kyphosis and rotoscoliosis. X-ray of the lumbar spine  multilevel degenerative disc disease and question mild lower lumbar facet  arthropathy.     Mild levorotoscoliosis.     Nonspecific sclerosis along both sacroiliac joints. Correlate clinically.     MRI can be obtained if further evaluation is required for above findings. Plan     I have discussed with patient at length her results and educated her about correction of posture   To avoid heavy lifting with bending     Also discussed importance of exercising and stretching muscles strong and flexible to support her spine    We will get MRI of thoracic and lumbar spine  She has appointment with physical therapy evaluation tomorrow    After MRI will consider possible orthopedic referral    No past medical history on file. Past Surgical History:   Procedure Laterality Date    HX  SECTION       Social History     Tobacco Use    Smoking status: Never Smoker    Smokeless tobacco: Never Used   Substance Use Topics    Alcohol use: No       Family History   Problem Relation Age of Onset    Diabetes Mother     Hypertension Father        Review of Systems   Constitutional: Negative for chills, fever, malaise/fatigue and weight loss.    HENT: Negative for congestion, ear discharge, ear pain, hearing loss, nosebleeds, sinus pain and sore throat. Eyes: Negative for blurred vision, double vision and discharge. Respiratory: Negative for cough, hemoptysis, sputum production, shortness of breath and wheezing. Cardiovascular: Negative for chest pain, palpitations, claudication and leg swelling. Gastrointestinal: Negative for abdominal pain, constipation, diarrhea, nausea and vomiting. Genitourinary: Negative for dysuria, frequency and urgency. Musculoskeletal: Positive for back pain and neck pain. Negative for falls, joint pain and myalgias. Skin: Negative for itching and rash. Neurological: Negative for dizziness, tingling, sensory change, speech change, focal weakness, weakness and headaches. Psychiatric/Behavioral: Negative for depression and suicidal ideas. Physical Exam  Vitals and nursing note reviewed. Constitutional:       General: She is not in acute distress. Appearance: She is well-developed. She is not diaphoretic. HENT:      Head: Normocephalic and atraumatic. Mouth/Throat:      Pharynx: No oropharyngeal exudate. Eyes:      General: No scleral icterus. Right eye: No discharge. Left eye: No discharge. Conjunctiva/sclera: Conjunctivae normal.      Pupils: Pupils are equal, round, and reactive to light. Neck:      Thyroid: No thyromegaly. Cardiovascular:      Rate and Rhythm: Normal rate and regular rhythm. Heart sounds: Normal heart sounds. No murmur heard. Pulmonary:      Effort: Pulmonary effort is normal. No respiratory distress. Breath sounds: Normal breath sounds. No wheezing or rales. Chest:      Chest wall: No tenderness. Abdominal:      General: There is no distension. Palpations: Abdomen is soft. Tenderness: There is no abdominal tenderness. There is no rebound. Musculoskeletal:         General: No tenderness or deformity. Normal range of motion. Lymphadenopathy:      Cervical: No cervical adenopathy. Skin:     General: Skin is warm and dry. Coloration: Skin is not pale. Findings: No erythema or rash. Neurological:      Mental Status: She is alert and oriented to person, place, and time. Cranial Nerves: No cranial nerve deficit. Coordination: Coordination normal.   Psychiatric:         Behavior: Behavior normal.         Thought Content: Thought content normal.         Judgment: Judgment normal.          Assessment and plan     Plan of care has been discussed with the patient, he agrees to the plan and verbalized understanding. All his questions were answered  More than 50% of the time spent in this visit was counseling the patient about  illness and treatment options         1. Encounter for screening mammogram for breast cancer  To schedule mammogram when she is 6 months from Covid vaccine  - [facility] MAMMO BI SCREENING INCL CAD; Future    2. DDD (degenerative disc disease), cervical    - MRI [facility] SPINE WO CONT; Future    3. Kyphosis (acquired) (postural)    - MRI [facility] SPINE WO CONT; Future    4. Rotoscoliosis    - MRI [facility] SPINE WO CONT; Future    5. DDD (degenerative disc disease), lumbar    - MRI LUMB SPINE W CONT; Future    6. Chronic low back pain, unspecified back pain laterality, unspecified whether sciatica present    - MRI LUMB SPINE W CONT; Future    7. Chronic bilateral thoracic back pain    - MRI [facility] SPINE WO CONT; Future    Current Outpatient Medications   Medication Sig Dispense Refill    ergocalciferol (Vitamin D2) 1,250 mcg (50,000 unit) capsule Take 1 Capsule by mouth every seven (7) days for 90 days. 12 Capsule 3    tiZANidine (ZANAFLEX) 2 mg tablet Take 1 Tablet by mouth three (3) times daily as needed for Muscle Spasm(s) for up to 30 days. 90 Tablet 0    meloxicam (MOBIC) 15 mg tablet Take 1 Tablet by mouth daily as needed for Pain for up to 90 days.  90 Tablet 1       Patient Active Problem List Diagnosis Date Noted    Chronic bilateral low back pain 01/21/2019    Vitamin D deficiency 01/21/2019     Results for orders placed or performed in visit on 08/03/21   URINALYSIS W/ RFLX MICROSCOPIC   Result Value Ref Range    Specific Gravity 1.023 1.005 - 1.030    pH (UA) 5.5 5.0 - 7.5    Color Yellow Yellow    Appearance Clear Clear    Leukocyte Esterase Negative Negative    Protein Negative Negative/Trace    Glucose Negative Negative    Ketone Negative Negative    Blood 1+ (A) Negative    Bilirubin Negative Negative    Urobilinogen 0.2 0.2 - 1.0 mg/dL    Nitrites Negative Negative    Microscopic Examination See additional order    TSH AND FREE T4   Result Value Ref Range    TSH 1.440 0.450 - 4.500 uIU/mL    T4, Free 1.21 0.82 - 1.77 ng/dL   VITAMIN D, 25 HYDROXY   Result Value Ref Range    VITAMIN D, 25-HYDROXY 21.7 (L) 30.0 - 100.0 ng/mL   HEMOGLOBIN A1C W/O EAG   Result Value Ref Range    Hemoglobin A1c 4.6 (L) 4.8 - 5.6 %   LIPID PANEL   Result Value Ref Range    Cholesterol, total 125 100 - 199 mg/dL    Triglyceride 33 0 - 149 mg/dL    HDL Cholesterol 45 >39 mg/dL    VLDL, calculated 9 5 - 40 mg/dL    LDL, calculated 71 0 - 99 mg/dL   METABOLIC PANEL, COMPREHENSIVE   Result Value Ref Range    Glucose 100 (H) 65 - 99 mg/dL    BUN 10 6 - 24 mg/dL    Creatinine 0.45 (L) 0.57 - 1.00 mg/dL    GFR est non- >59 mL/min/1.73    GFR est  >59 mL/min/1.73    BUN/Creatinine ratio 22 9 - 23    Sodium 139 134 - 144 mmol/L    Potassium 3.8 3.5 - 5.2 mmol/L    Chloride 104 96 - 106 mmol/L    CO2 24 20 - 29 mmol/L    Calcium 9.3 8.7 - 10.2 mg/dL    Protein, total 6.6 6.0 - 8.5 g/dL    Albumin 4.1 3.8 - 4.8 g/dL    GLOBULIN, TOTAL 2.5 1.5 - 4.5 g/dL    A-G Ratio 1.6 1.2 - 2.2    Bilirubin, total 0.4 0.0 - 1.2 mg/dL    Alk.  phosphatase 56 48 - 121 IU/L    AST (SGOT) 10 0 - 40 IU/L    ALT (SGPT) 7 0 - 32 IU/L   CBC WITH AUTOMATED DIFF   Result Value Ref Range    WBC 8.4 3.4 - 10.8 x10E3/uL    RBC 3.94 3.77 - 5.28 x10E6/uL    HGB 11.7 11.1 - 15.9 g/dL    HCT 35.2 34.0 - 46.6 %    MCV 89 79 - 97 fL    MCH 29.7 26.6 - 33.0 pg    MCHC 33.2 31.5 - 35.7 g/dL    RDW 11.7 11.7 - 15.4 %    PLATELET 788 443 - 035 x10E3/uL    NEUTROPHILS 71 Not Estab. %    Lymphocytes 21 Not Estab. %    MONOCYTES 7 Not Estab. %    EOSINOPHILS 1 Not Estab. %    BASOPHILS 0 Not Estab. %    ABS. NEUTROPHILS 6.0 1.4 - 7.0 x10E3/uL    Abs Lymphocytes 1.7 0.7 - 3.1 x10E3/uL    ABS. MONOCYTES 0.6 0.1 - 0.9 x10E3/uL    ABS. EOSINOPHILS 0.1 0.0 - 0.4 x10E3/uL    ABS. BASOPHILS 0.0 0.0 - 0.2 x10E3/uL    IMMATURE GRANULOCYTES 0 Not Estab. %    ABS. IMM. GRANS. 0.0 0.0 - 0.1 x10E3/uL   MICROSCOPIC EXAMINATION   Result Value Ref Range    WBC None seen 0 - 5 /hpf    RBC 0-2 0 - 2 /hpf    Epithelial cells 0-10 0 - 10 /hpf    Casts None seen None seen /lpf    Bacteria None seen None seen/Few   CVD REPORT   Result Value Ref Range    INTERPRETATION Note      Appointment on 08/03/2021   Component Date Value Ref Range Status    Specific Gravity 08/03/2021 1.023  1.005 - 1.030 Final    pH (UA) 08/03/2021 5.5  5.0 - 7.5 Final    Color 08/03/2021 Yellow  Yellow Final    Appearance 08/03/2021 Clear  Clear Final    Leukocyte Esterase 08/03/2021 Negative  Negative Final    Protein 08/03/2021 Negative  Negative/Trace Final    Glucose 08/03/2021 Negative  Negative Final    Ketone 08/03/2021 Negative  Negative Final    Blood 08/03/2021 1+* Negative Final    Bilirubin 08/03/2021 Negative  Negative Final    Urobilinogen 08/03/2021 0.2  0.2 - 1.0 mg/dL Final    Nitrites 08/03/2021 Negative  Negative Final    Microscopic Examination 08/03/2021 See additional order   Final    Microscopic was indicated and was performed.     TSH 08/03/2021 1.440  0.450 - 4.500 uIU/mL Final    T4, Free 08/03/2021 1.21  0.82 - 1.77 ng/dL Final    VITAMIN D, 25-HYDROXY 08/03/2021 21.7* 30.0 - 100.0 ng/mL Final    Comment: Vitamin D deficiency has been defined by the [place] of  Medicine and an Endocrine Society practice guideline as a  level of serum 25-OH vitamin D less than 20 ng/mL (1,2). The Endocrine Society went on to further define vitamin D  insufficiency as a level between 21 and 29 ng/mL (2). 1. IOM ([place] of Medicine). 2010. Dietary reference     intakes for calcium and D. [facility]: The     [employer]. 2. Shreyas MF, Suraj NC, Aniceto BATRES, et al.     Evaluation, treatment, and prevention of vitamin D     deficiency: an Endocrine Society clinical practice     guideline. JCEM. 2011 Jul; 96(9):1911-30.  Hemoglobin A1c 08/03/2021 4.6* 4.8 - 5.6 % Final    Comment:          Prediabetes: 5.7 - 6.4           Diabetes: >6.4           Glycemic control for adults with diabetes: <7.0      Cholesterol, total 08/03/2021 125  100 - 199 mg/dL Final    Triglyceride 08/03/2021 33  0 - 149 mg/dL Final    HDL Cholesterol 08/03/2021 45  >39 mg/dL Final    VLDL, calculated 08/03/2021 9  5 - 40 mg/dL Final    LDL, calculated 08/03/2021 71  0 - 99 mg/dL Final    Glucose 08/03/2021 100* 65 - 99 mg/dL Final    BUN 08/03/2021 10  6 - 24 mg/dL Final    Creatinine 08/03/2021 0.45* 0.57 - 1.00 mg/dL Final    GFR est non-AA 08/03/2021 125  >59 mL/min/1.73 Final    GFR est AA 08/03/2021 144  >59 mL/min/1.73 Final    Comment: **[facility] currently reports eGFR in compliance with the current**    recommendations of the [employer]. Carol Winn will    update reporting as new guidelines are published from the NKF-ASN    Task force.       BUN/Creatinine ratio 08/03/2021 22  9 - 23 Final    Sodium 08/03/2021 139  134 - 144 mmol/L Final    Potassium 08/03/2021 3.8  3.5 - 5.2 mmol/L Final    Chloride 08/03/2021 104  96 - 106 mmol/L Final    CO2 08/03/2021 24  20 - 29 mmol/L Final    Calcium 08/03/2021 9.3  8.7 - 10.2 mg/dL Final    Protein, total 08/03/2021 6.6  6.0 - 8.5 g/dL Final    Albumin 08/03/2021 4.1  3.8 - 4.8 g/dL Final    GLOBULIN, TOTAL 08/03/2021 2.5  1.5 - 4.5 g/dL Final    A-G Ratio 08/03/2021 1.6  1.2 - 2.2 Final    Bilirubin, total 08/03/2021 0.4  0.0 - 1.2 mg/dL Final    Alk. phosphatase 08/03/2021 56  48 - 121 IU/L Final    AST (SGOT) 08/03/2021 10  0 - 40 IU/L Final    ALT (SGPT) 08/03/2021 7  0 - 32 IU/L Final    WBC 08/03/2021 8.4  3.4 - 10.8 x10E3/uL Final    RBC 08/03/2021 3.94  3.77 - 5.28 x10E6/uL Final    HGB 08/03/2021 11.7  11.1 - 15.9 g/dL Final    HCT 08/03/2021 35.2  34.0 - 46.6 % Final    MCV 08/03/2021 89  79 - 97 fL Final    MCH 08/03/2021 29.7  26.6 - 33.0 pg Final    MCHC 08/03/2021 33.2  31.5 - 35.7 g/dL Final    RDW 08/03/2021 11.7  11.7 - 15.4 % Final    PLATELET 39/89/9310 194  150 - 450 x10E3/uL Final    NEUTROPHILS 08/03/2021 71  Not Estab. % Final    Lymphocytes 08/03/2021 21  Not Estab. % Final    MONOCYTES 08/03/2021 7  Not Estab. % Final    EOSINOPHILS 08/03/2021 1  Not Estab. % Final    BASOPHILS 08/03/2021 0  Not Estab. % Final    ABS. NEUTROPHILS 08/03/2021 6.0  1.4 - 7.0 x10E3/uL Final    Abs Lymphocytes 08/03/2021 1.7  0.7 - 3.1 x10E3/uL Final    ABS. MONOCYTES 08/03/2021 0.6  0.1 - 0.9 x10E3/uL Final    ABS. EOSINOPHILS 08/03/2021 0.1  0.0 - 0.4 x10E3/uL Final    ABS. BASOPHILS 08/03/2021 0.0  0.0 - 0.2 x10E3/uL Final    IMMATURE GRANULOCYTES 08/03/2021 0  Not Estab. % Final    ABS. IMM. GRANS. 08/03/2021 0.0  0.0 - 0.1 x10E3/uL Final    WBC 08/03/2021 None seen  0 - 5 /hpf Final    RBC 08/03/2021 0-2  0 - 2 /hpf Final    Epithelial cells 08/03/2021 0-10  0 - 10 /hpf Final    Casts 08/03/2021 None seen  None seen /lpf Final    Bacteria 08/03/2021 None seen  None seen/Few Final    INTERPRETATION 08/03/2021 Note   Final    Supplemental report is available. Follow-up and Dispositions    · Return if symptoms worsen or fail to improve, for as per results.

## 2021-08-17 NOTE — PROGRESS NOTES
Haily Hooper is a 38 y.o. female (: 1980) presenting to address:    Chief Complaint   Patient presents with    Results     follow up x ray results       Vitals:    21 1046   BP: 96/68   Pulse: 73   Resp: 16   Temp: 98.3 °F (36.8 °C)   TempSrc: Temporal   SpO2: 100%   Weight: 172 lb (78 kg)   Height: 5' 8\" (1.727 m)       Hearing/Vision:   No exam data present    Learning Assessment:     Learning Assessment 2020   PRIMARY LEARNER Patient   HIGHEST LEVEL OF EDUCATION - PRIMARY LEARNER  4 YEARS OF COLLEGE   BARRIERS PRIMARY LEARNER NONE   CO-LEARNER CAREGIVER No   PRIMARY LANGUAGE OTHER (COMMENT)   LEARNER PREFERENCE PRIMARY READING   ANSWERED BY patient   RELATIONSHIP SELF     Depression Screening:     3 most recent PHQ Screens 2021   Little interest or pleasure in doing things Not at all   Feeling down, depressed, irritable, or hopeless Not at all   Total Score PHQ 2 0     Fall Risk Assessment:     Fall Risk Assessment, last 12 mths 2018   Able to walk? Yes   Fall in past 12 months? No     Abuse Screening:     Abuse Screening Questionnaire 2021   Do you ever feel afraid of your partner? N   Are you in a relationship with someone who physically or mentally threatens you? N   Is it safe for you to go home? Y     Coordination of Care Questionaire:   1. Have you been to the ER, urgent care clinic since your last visit? Hospitalized since your last visit? NO    2. Have you seen or consulted any other health care providers outside of the [address] [place] [address] since your last visit? Include any pap smears or colon screening. NO    Advanced Directive:   1. Do you have an Advanced Directive? NO    2. Would you like information on Advanced Directives?  NO

## 2021-08-18 ENCOUNTER — HOSPITAL ENCOUNTER (OUTPATIENT)
Dept: PHYSICAL THERAPY | Age: 41
Discharge: HOME | End: 2021-08-18
Payer: MEDICAID

## 2021-08-18 PROCEDURE — 97161 PT EVAL LOW COMPLEX 20 MIN: CPT

## 2021-08-18 NOTE — PROGRESS NOTES
PHYSICAL THERAPY - DAILY TREATMENT NOTE    Patient Name: Shubham Galarza        Date: 2021  : 1980   YES Patient  Verified  Visit #:      12  Insurance: Payor: [employer] MEDICAID / Plan: [facility] [employer] PLUS / Product Type: Managed Care Medicaid /      In time: 1:40 Out time: 2:30   Total Treatment Time: 50     Medicare/BCBS Time Tracking (below)   Total Timed Codes (min):  -- 1:1 Treatment Time:  --     TREATMENT AREA =  LBP    SUBJECTIVE    Pain Level (on 0 to 10 scale):  4  / 10   Medication Changes/New allergies or changes in medical history, any new surgeries or procedures? NO    If yes, update Summary List   Subjective Functional Status/Changes:  []  No changes reported     See POC          OBJECTIVE    5 min Therapeutic Exercise:  [x]  See flow sheet   Rationale:      increase ROM and increase strength to improve the patients ability to ADL, sleep       Billed With/As:   [x] TE   [] TA   [] Neuro   [x] Self Care Patient Education: [x] Review HEP    [] Progressed/Changed HEP based on:   [] positioning   [] body mechanics   [] transfers   [] heat/ice application    [] other:        Other Objective/Functional Measures:    Shown and performed HEP     Post Treatment Pain Level (on 0 to 10) scale:   4  10     ASSESSMENT  Assessment/Changes in Function:     See POC     []  See Progress Note/Recertification   Patient will continue to benefit from skilled PT services to modify and progress therapeutic interventions, address functional mobility deficits, address ROM deficits, analyze and address soft tissue restrictions, analyze and cue movement patterns and analyze and modify body mechanics/ergonomics to attain goals per POC.    Progress toward goals / Updated goals:    See POC     PLAN  [x]  Upgrade activities as tolerated YES Continue plan of care   []  Discharge due to :    []  Other:      Therapist: Francisca Solorio PT, DPT, OCS, CSCS    Date: 2021 Time: 1:42 PM

## 2021-08-18 NOTE — PROGRESS NOTES
[address], [facility] [address],[facility], [address] [facility] - Phone: [phone]  Fax: [phone] / [phone] [facility]  Patient Name: Earnest Portillo : 1980   Medical   Diagnosis: LBP Treatment Diagnosis: Low back pain [M54.5]   Onset Date: 5 years ago     Referral Source: Iban Desir MD [facility]): 2021   Prior Hospitalization: See medical history Provider #: [id number]   Prior Level of Function: Previously no pain waking at night or pain with bending, lifting, reaching   Comorbidities: none   Medications: Verified on Patient Summary List   The Plan of Care and following information is based on the information from the initial evaluation.   ===========================================================================================  Assessment / key information:  Earnest Portillo is a 38 y.o.  yo female with Dx: LBP, who reports 5 years ago having insidious onset of lower back pain, potentially coinciding with carlos autism treatments. Pt rates pain as 9/10 max, 2/10 at best, 4/10 today, located at central mid/lower back as well as right/left PSIS. Also reports stiffness in CS as well needing to use 3 pillows to sleep at times. Prior treatment includes PT approx 2 years ago but limited on ability to do HEP. Xrays taken recently show mild to mod degeneration in LS/CS. Objective: FOTO score = 54 (an established functional score where 100 = no disability). LS ROM flex 50% pain on descent and rising, ext 50% painful/pressure at end range. TS rot standing WNL bilat without pain. LE strength reduced hip abd bilat. Lower trap painful and reduced. Special tests: HS tightnes noted with Slump, SLR (left > right), BEVERLY/FADDIR + right for ant hip pain, Pt reports this pain throughout day as well, question some hip pathology on right.   Palpation shows tenderness bilat QL, glute min/med, piriformis, also significant increased tone bilat TS paraspinals. Pt instructed in HEP and will f/u in clinic for PT. Pt to call to schedule after following up as recommended with referring physician to discuss Xray and potential further referral to specialist if warranted.    ===========================================================================================  Eval Complexity: History LOW Complexity : Zero comorbidities / personal factors that will impact the outcome / POC;  Examination  HIGH Complexity : 4+ Standardized tests and measures addressing body structure, function, activity limitation and / or participation in recreation ; Presentation LOW Complexity : Stable, uncomplicated ;  Decision Making MEDIUM Complexity : FOTO score of 26-74; Overall Complexity LOW   Problem List: pain affecting function, decrease ROM, decrease strength, decrease ADL/ functional abilitiies, decrease activity tolerance and decrease flexibility/ joint mobility FOTO = 54  Treatment Plan may include any combination of the following: Therapeutic exercise, Therapeutic activities, Neuromuscular re-education, Physical agent/modality, Manual therapy, Patient education and Self Care training  Patient / Family readiness to learn indicated by: asking questions, trying to perform skills and interest  Persons(s) to be included in education: patient (P)  Barriers to Learning/Limitations: no  Measures taken, if barriers to learning: N/A   Patient Goal (s): Reduce pain, improve sleep   Patient self reported health status: good  Rehabilitation Potential: good   Short Term Goals: To be accomplished in  1-2  weeks:  1. Independent with HEP. 2. Decrease max pain 25-50% to assist with sleeping through the night, bending to lift children.  Long Term Goals: To be accomplished in  4-6  weeks:  1. Decrease max pain 50-75% to assist with sleeping through the night, bending to lift children.   2.  Improve FOTO Functional Status Score by 12 points in order to show significant functional improvement. 3.  Will have improved lower trap strength and tolerance to resistance to assist with sitting prolonged and ADLs involving lifting/reaching. Frequency / Duration:   Patient to be seen  2-3  times per week for 4-6  weeks:  Patient / Caregiver education and instruction: self care and exercises  Therapist Signature: Mayra Aden PT, DPT, OCS, CSCS Date: 8/39/6694   Certification Period: NA Time: 1:42 PM   ===========================================================================================  I certify that the above Physical Therapy Services are being furnished while the patient is under my care. I agree with the treatment plan and certify that this therapy is necessary. Physician Signature:        Date:       Time:                                         Jany Helm MD  Please sign and return to [facility] or you may fax the signed copy to [phone]. Thank you.

## 2021-08-27 ENCOUNTER — TELEPHONE (OUTPATIENT)
Dept: FAMILY MEDICINE CLINIC | Age: 41
End: 2021-08-27

## 2021-08-27 NOTE — TELEPHONE ENCOUNTER
[facility] called stating that patients insurance is requesting a peer to peer with Dr. Jason Urias by 3PM today.  Patient is scheduled for the test on 8/30     Number to call is [phone]

## 2021-09-10 ENCOUNTER — TELEPHONE (OUTPATIENT)
Dept: FAMILY MEDICINE CLINIC | Age: 41
End: 2021-09-10

## 2021-09-10 DIAGNOSIS — K21.9 GASTROESOPHAGEAL REFLUX DISEASE, UNSPECIFIED WHETHER ESOPHAGITIS PRESENT: Primary | ICD-10-CM

## 2021-09-10 RX ORDER — OMEPRAZOLE 40 MG/1
40 CAPSULE, DELAYED RELEASE ORAL DAILY
Qty: 30 CAPSULE | Refills: 0 | Status: SHIPPED | OUTPATIENT
Start: 2021-09-10 | End: 2021-10-10

## 2021-09-10 NOTE — TELEPHONE ENCOUNTER
patient called with severe heart burn and reflux symptoms advised to avoid fatty food and spicy or acidic

## 2021-11-08 NOTE — PROGRESS NOTES
[facility]  [address] [place] [address] [address],Kaitlyn Montilla, [address] - Phone: [phone]  Fax: [phone]    DISCHARGE NOTE  Patient Name: Haily Hooper : 1980   Treatment/Medical Diagnosis: Low back pain [M54.5]   Referral Source: Lv Bowman MD     Date of Initial Visit: 21 Attended Visits: 1 Missed Visits: 0       SUMMARY OF TREATMENT  Pt attended only initial evaluation and     0     follow-ups and then did not return. Therefore a formal reassessment of goals was not performed. RECOMMENDATIONS  Discontinue physical therapy due to patient not returning. If you have any questions/comments please contact us directly at [phone]. Thank you for allowing us to assist in the care of your patient. Therapist Signature: Jesi Cadena PT, DPT, OCS, CSCS Date: 21     Time: 5:17 PM   NOTE TO PHYSICIAN:  Your patient's insurance requires this discharge note be signed and returned. PLEASE COMPLETE THE ORDERS BELOW AND RETURN TO:  [facility]    ___ I have read the above report and request that my patient be discharged from therapy.      Physician Signature:        Date:       Time:                                        Lv Bowman MD

## 2021-11-09 ENCOUNTER — TELEPHONE (OUTPATIENT)
Dept: FAMILY MEDICINE CLINIC | Age: 41
End: 2021-11-09

## 2021-11-09 DIAGNOSIS — M40.00 KYPHOSIS (ACQUIRED) (POSTURAL): ICD-10-CM

## 2021-11-09 DIAGNOSIS — M51.36 DDD (DEGENERATIVE DISC DISEASE), LUMBAR: ICD-10-CM

## 2021-11-09 DIAGNOSIS — M50.30 DDD (DEGENERATIVE DISC DISEASE), CERVICAL: Primary | ICD-10-CM

## 2021-11-15 ENCOUNTER — OFFICE VISIT (OUTPATIENT)
Dept: FAMILY MEDICINE CLINIC | Age: 41
End: 2021-11-15
Payer: MEDICAID

## 2021-11-15 VITALS
HEART RATE: 83 BPM | RESPIRATION RATE: 14 BRPM | BODY MASS INDEX: 26.52 KG/M2 | SYSTOLIC BLOOD PRESSURE: 115 MMHG | HEIGHT: 68 IN | WEIGHT: 175 LBS | TEMPERATURE: 98 F | OXYGEN SATURATION: 100 % | DIASTOLIC BLOOD PRESSURE: 75 MMHG

## 2021-11-15 DIAGNOSIS — M51.36 DDD (DEGENERATIVE DISC DISEASE), LUMBAR: Primary | ICD-10-CM

## 2021-11-15 DIAGNOSIS — M62.838 MUSCLE SPASM: ICD-10-CM

## 2021-11-15 DIAGNOSIS — E55.9 VITAMIN D DEFICIENCY: ICD-10-CM

## 2021-11-15 DIAGNOSIS — G89.29 CHRONIC UPPER BACK PAIN: ICD-10-CM

## 2021-11-15 DIAGNOSIS — M40.00 KYPHOSIS (ACQUIRED) (POSTURAL): ICD-10-CM

## 2021-11-15 DIAGNOSIS — M54.9 CHRONIC UPPER BACK PAIN: ICD-10-CM

## 2021-11-15 PROCEDURE — 99213 OFFICE O/P EST LOW 20 MIN: CPT | Performed by: LEGAL MEDICINE

## 2021-11-15 NOTE — PROGRESS NOTES
Dima Posada is a 42 y.o. female (: 1980) presenting to address:    Chief Complaint   Patient presents with    Follow-up     3 month f/u       Vitals:    11/15/21 1204   BP: 115/75   Pulse: 83   Resp: 14   Temp: 98 °F (36.7 °C)   TempSrc: Temporal   SpO2: 100%   Weight: 175 lb (79.4 kg)   Height: 5' 8\" (1.727 m)   PainSc:   0 - No pain       Hearing/Vision:   No exam data present    Learning Assessment:     Learning Assessment 2020   PRIMARY LEARNER Patient   HIGHEST LEVEL OF EDUCATION - PRIMARY LEARNER  4 YEARS OF COLLEGE   BARRIERS PRIMARY LEARNER NONE   CO-LEARNER CAREGIVER No   PRIMARY LANGUAGE OTHER (COMMENT)   LEARNER PREFERENCE PRIMARY READING   ANSWERED BY patient   RELATIONSHIP SELF     Depression Screening:     3 most recent PHQ Screens 11/15/2021   Little interest or pleasure in doing things Not at all   Feeling down, depressed, irritable, or hopeless Not at all   Total Score PHQ 2 0     Fall Risk Assessment:     Fall Risk Assessment, last 12 mths 2018   Able to walk? Yes   Fall in past 12 months? No     Abuse Screening:     Abuse Screening Questionnaire 2021   Do you ever feel afraid of your partner? N   Are you in a relationship with someone who physically or mentally threatens you? N   Is it safe for you to go home?  Y     ADL Assessment:     ADL Assessment 2018   Feeding yourself No Help Needed   Getting from bed to chair No Help Needed   Getting dressed No Help Needed   Bathing or showering No Help Needed   Walk across the room (includes cane/walker) No Help Needed   Using the telphone No Help Needed   Taking your medications No Help Needed   Preparing meals No Help Needed   Managing money (expenses/bills) No Help Needed   Moderately strenuous housework (laundry) No Help Needed   Shopping for personal items (toiletries/medicines) No Help Needed   Shopping for groceries No Help Needed   Driving No Help Needed   Climbing a flight of stairs No Help Needed   Getting to places beyond walking distances No Help Needed        Coordination of Care Questionaire:   1. \"Have you been to the ER, urgent care clinic since your last visit? Hospitalized since your last visit? \" No    2. \"Have you seen or consulted any other health care providers outside of the [address] [place] [address] since your last visit? \" No         If the patient is female:    4. For patients aged 41-77: Has the patient had a mammogram within the past 2 years? No    5. For patients aged 21-65: Has the patient had a pap smear? No    Advanced Directive:   1. Do you have an Advanced Directive? NO    2. Would you like information on Advanced Directives? NO    PATIENT DECLINED FLU VACCINE.

## 2021-11-15 NOTE — PROGRESS NOTES
Nehemiah Robb     Chief Complaint   Patient presents with    Follow-up     3 month f/u     Vitals:    11/15/21 1204   BP: 115/75   Pulse: 83   Resp: 14   Temp: 98 °F (36.7 °C)   TempSrc: Temporal   SpO2: 100%   Weight: 175 lb (79.4 kg)   Height: 5' 8\" (1.727 m)   PainSc:   0 - No pain         HPI: is here for follow up , she is still having low back pain and has not started PT yet(she was diagnosed with COVID-19 2 months ago0 she has appointment for evaluation tomorrow     Low back pain as well as upper back pain, she has purchased upper back brace but she is not wearing it regularly. She has not been exercising on regular basis, she need to start exercising and stretching, and to do strengthening exercises like yoga and Pilates  She is busy with her children but she will try stronger as her schedule        No past medical history on file. Past Surgical History:   Procedure Laterality Date    HX  SECTION       Social History     Tobacco Use    Smoking status: Never Smoker    Smokeless tobacco: Never Used   Substance Use Topics    Alcohol use: No       Family History   Problem Relation Age of Onset    Diabetes Mother     Hypertension Father        Review of Systems   Constitutional: Negative for chills, fever, malaise/fatigue and weight loss. HENT: Negative for congestion, ear discharge, ear pain, hearing loss, nosebleeds, sinus pain and sore throat. Eyes: Negative for blurred vision, double vision and discharge. Respiratory: Negative for cough, hemoptysis, sputum production, shortness of breath and wheezing. Cardiovascular: Negative for chest pain, palpitations, claudication and leg swelling. Gastrointestinal: Negative for abdominal pain, constipation, diarrhea, nausea and vomiting. Genitourinary: Negative for dysuria, frequency and urgency. Musculoskeletal: Positive for back pain and myalgias. Negative for falls, joint pain and neck pain.    Skin: Negative for itching and rash. Neurological: Negative for dizziness, tingling, sensory change, speech change, focal weakness, weakness and headaches. Physical Exam  Constitutional:       General: She is not in acute distress. Appearance: Normal appearance. She is well-developed. She is not diaphoretic. HENT:      Head: Normocephalic and atraumatic. Mouth/Throat:      Pharynx: No oropharyngeal exudate. Eyes:      General: No scleral icterus. Right eye: No discharge. Left eye: No discharge. Neck:      Thyroid: No thyromegaly. Cardiovascular:      Rate and Rhythm: Normal rate and regular rhythm. Heart sounds: Normal heart sounds. No murmur heard. Pulmonary:      Effort: Pulmonary effort is normal. No respiratory distress. Breath sounds: Normal breath sounds. No wheezing or rales. Chest:      Chest wall: No tenderness. Abdominal:      General: There is no distension. Palpations: Abdomen is soft. Tenderness: There is no abdominal tenderness. There is no rebound. Musculoskeletal:         General: No tenderness or deformity. Normal range of motion. Comments: Mild Kyphosis of the upper back   Lymphadenopathy:      Cervical: No cervical adenopathy. Skin:     General: Skin is warm and dry. Coloration: Skin is not pale. Findings: No erythema or rash. Neurological:      Mental Status: She is alert and oriented to person, place, and time. Cranial Nerves: No cranial nerve deficit. Coordination: Coordination normal.      Gait: Gait normal.   Psychiatric:         Behavior: Behavior normal.         Thought Content: Thought content normal.         Judgment: Judgment normal.          Assessment and plan     Plan of care has been discussed with the patient, he agrees to the plan and verbalized understanding.   All his questions were answered  More than 50% of the time spent in this visit was counseling the patient about  illness and treatment options 1. Kyphosis (acquired) (postural)      2. DDD (degenerative disc disease), lumbar    3. Vitamin D deficiency  Patient need to take vitamin D on regular basis    4. Muscle spasm  Flexeril as needed    5. Chronic upper back pain  And physical therapy evaluation  Current Outpatient Medications   Medication Sig Dispense Refill    ergocalciferol (Vitamin D2) 1,250 mcg (50,000 unit) capsule Take 1 Capsule by mouth every seven (7) days for 90 days.  12 Capsule 3       Patient Active Problem List    Diagnosis Date Noted    Chronic bilateral low back pain 01/21/2019    Vitamin D deficiency 01/21/2019     Results for orders placed or performed in visit on 08/03/21   URINALYSIS W/ RFLX MICROSCOPIC   Result Value Ref Range    Specific Gravity 1.023 1.005 - 1.030    pH (UA) 5.5 5.0 - 7.5    Color Yellow Yellow    Appearance Clear Clear    Leukocyte Esterase Negative Negative    Protein Negative Negative/Trace    Glucose Negative Negative    Ketone Negative Negative    Blood 1+ (A) Negative    Bilirubin Negative Negative    Urobilinogen 0.2 0.2 - 1.0 mg/dL    Nitrites Negative Negative    Microscopic Examination See additional order    TSH AND FREE T4   Result Value Ref Range    TSH 1.440 0.450 - 4.500 uIU/mL    T4, Free 1.21 0.82 - 1.77 ng/dL   VITAMIN D, 25 HYDROXY   Result Value Ref Range    VITAMIN D, 25-HYDROXY 21.7 (L) 30.0 - 100.0 ng/mL   HEMOGLOBIN A1C W/O EAG   Result Value Ref Range    Hemoglobin A1c 4.6 (L) 4.8 - 5.6 %   LIPID PANEL   Result Value Ref Range    Cholesterol, total 125 100 - 199 mg/dL    Triglyceride 33 0 - 149 mg/dL    HDL Cholesterol 45 >39 mg/dL    VLDL, calculated 9 5 - 40 mg/dL    LDL, calculated 71 0 - 99 mg/dL   METABOLIC PANEL, COMPREHENSIVE   Result Value Ref Range    Glucose 100 (H) 65 - 99 mg/dL    BUN 10 6 - 24 mg/dL    Creatinine 0.45 (L) 0.57 - 1.00 mg/dL    GFR est non- >59 mL/min/1.73    GFR est  >59 mL/min/1.73    BUN/Creatinine ratio 22 9 - 23    Sodium 139 134 - 144 mmol/L    Potassium 3.8 3.5 - 5.2 mmol/L    Chloride 104 96 - 106 mmol/L    CO2 24 20 - 29 mmol/L    Calcium 9.3 8.7 - 10.2 mg/dL    Protein, total 6.6 6.0 - 8.5 g/dL    Albumin 4.1 3.8 - 4.8 g/dL    GLOBULIN, TOTAL 2.5 1.5 - 4.5 g/dL    A-G Ratio 1.6 1.2 - 2.2    Bilirubin, total 0.4 0.0 - 1.2 mg/dL    Alk. phosphatase 56 48 - 121 IU/L    AST (SGOT) 10 0 - 40 IU/L    ALT (SGPT) 7 0 - 32 IU/L   CBC WITH AUTOMATED DIFF   Result Value Ref Range    WBC 8.4 3.4 - 10.8 x10E3/uL    RBC 3.94 3.77 - 5.28 x10E6/uL    HGB 11.7 11.1 - 15.9 g/dL    HCT 35.2 34.0 - 46.6 %    MCV 89 79 - 97 fL    MCH 29.7 26.6 - 33.0 pg    MCHC 33.2 31.5 - 35.7 g/dL    RDW 11.7 11.7 - 15.4 %    PLATELET 862 088 - 858 x10E3/uL    NEUTROPHILS 71 Not Estab. %    Lymphocytes 21 Not Estab. %    MONOCYTES 7 Not Estab. %    EOSINOPHILS 1 Not Estab. %    BASOPHILS 0 Not Estab. %    ABS. NEUTROPHILS 6.0 1.4 - 7.0 x10E3/uL    Abs Lymphocytes 1.7 0.7 - 3.1 x10E3/uL    ABS. MONOCYTES 0.6 0.1 - 0.9 x10E3/uL    ABS. EOSINOPHILS 0.1 0.0 - 0.4 x10E3/uL    ABS. BASOPHILS 0.0 0.0 - 0.2 x10E3/uL    IMMATURE GRANULOCYTES 0 Not Estab. %    ABS. IMM. GRANS. 0.0 0.0 - 0.1 x10E3/uL   MICROSCOPIC EXAMINATION   Result Value Ref Range    WBC None seen 0 - 5 /hpf    RBC 0-2 0 - 2 /hpf    Epithelial cells 0-10 0 - 10 /hpf    Casts None seen None seen /lpf    Bacteria None seen None seen/Few   CVD REPORT   Result Value Ref Range    INTERPRETATION Note      No visits with results within 3 Month(s) from this visit.    Latest known visit with results is:   Appointment on 08/03/2021   Component Date Value Ref Range Status    Specific Gravity 08/03/2021 1.023  1.005 - 1.030 Final    pH (UA) 08/03/2021 5.5  5.0 - 7.5 Final    Color 08/03/2021 Yellow  Yellow Final    Appearance 08/03/2021 Clear  Clear Final    Leukocyte Esterase 08/03/2021 Negative  Negative Final    Protein 08/03/2021 Negative  Negative/Trace Final    Glucose 08/03/2021 Negative  Negative Final    Ketone 08/03/2021 Negative  Negative Final    Blood 08/03/2021 1+* Negative Final    Bilirubin 08/03/2021 Negative  Negative Final    Urobilinogen 08/03/2021 0.2  0.2 - 1.0 mg/dL Final    Nitrites 08/03/2021 Negative  Negative Final    Microscopic Examination 08/03/2021 See additional order   Final    Microscopic was indicated and was performed.  TSH 08/03/2021 1.440  0.450 - 4.500 uIU/mL Final    T4, Free 08/03/2021 1.21  0.82 - 1.77 ng/dL Final    VITAMIN D, 25-HYDROXY 08/03/2021 21.7* 30.0 - 100.0 ng/mL Final    Comment: Vitamin D deficiency has been defined by the [address] practice guideline as a  level of serum 25-OH vitamin D less than 20 ng/mL (1,2). The Endocrine Society went on to further define vitamin D  insufficiency as a level between 21 and 29 ng/mL (2). 1. IOM ([place] of Medicine). 2010. Dietary reference     intakes for calcium and D. [facility]: The     [place]. 2. Shreyas MF, Suraj NC, Aniceto BATRES, et al.     Evaluation, treatment, and prevention of vitamin D     deficiency: an Endocrine Society clinical practice     guideline. JCEM. 2011 Jul; 96(7):1911-30.       Hemoglobin A1c 08/03/2021 4.6* 4.8 - 5.6 % Final    Comment:          Prediabetes: 5.7 - 6.4           Diabetes: >6.4           Glycemic control for adults with diabetes: <7.0      Cholesterol, total 08/03/2021 125  100 - 199 mg/dL Final    Triglyceride 08/03/2021 33  0 - 149 mg/dL Final    HDL Cholesterol 08/03/2021 45  >39 mg/dL Final    VLDL, calculated 08/03/2021 9  5 - 40 mg/dL Final    LDL, calculated 08/03/2021 71  0 - 99 mg/dL Final    Glucose 08/03/2021 100* 65 - 99 mg/dL Final    BUN 08/03/2021 10  6 - 24 mg/dL Final    Creatinine 08/03/2021 0.45* 0.57 - 1.00 mg/dL Final    GFR est non-AA 08/03/2021 125  >59 mL/min/1.73 Final    GFR est AA 08/03/2021 144  >59 mL/min/1.73 Final    Comment: **[facility] currently reports eGFR in compliance with the current**    recommendations of the [employer] [place]. [facility] will    update reporting as new guidelines are published from the NKF-ASN    Task force.  BUN/Creatinine ratio 08/03/2021 22  9 - 23 Final    Sodium 08/03/2021 139  134 - 144 mmol/L Final    Potassium 08/03/2021 3.8  3.5 - 5.2 mmol/L Final    Chloride 08/03/2021 104  96 - 106 mmol/L Final    CO2 08/03/2021 24  20 - 29 mmol/L Final    Calcium 08/03/2021 9.3  8.7 - 10.2 mg/dL Final    Protein, total 08/03/2021 6.6  6.0 - 8.5 g/dL Final    Albumin 08/03/2021 4.1  3.8 - 4.8 g/dL Final    GLOBULIN, TOTAL 08/03/2021 2.5  1.5 - 4.5 g/dL Final    A-G Ratio 08/03/2021 1.6  1.2 - 2.2 Final    Bilirubin, total 08/03/2021 0.4  0.0 - 1.2 mg/dL Final    Alk. phosphatase 08/03/2021 56  48 - 121 IU/L Final    AST (SGOT) 08/03/2021 10  0 - 40 IU/L Final    ALT (SGPT) 08/03/2021 7  0 - 32 IU/L Final    WBC 08/03/2021 8.4  3.4 - 10.8 x10E3/uL Final    RBC 08/03/2021 3.94  3.77 - 5.28 x10E6/uL Final    HGB 08/03/2021 11.7  11.1 - 15.9 g/dL Final    HCT 08/03/2021 35.2  34.0 - 46.6 % Final    MCV 08/03/2021 89  79 - 97 fL Final    MCH 08/03/2021 29.7  26.6 - 33.0 pg Final    MCHC 08/03/2021 33.2  31.5 - 35.7 g/dL Final    RDW 08/03/2021 11.7  11.7 - 15.4 % Final    PLATELET 51/57/1611 740  150 - 450 x10E3/uL Final    NEUTROPHILS 08/03/2021 71  Not Estab. % Final    Lymphocytes 08/03/2021 21  Not Estab. % Final    MONOCYTES 08/03/2021 7  Not Estab. % Final    EOSINOPHILS 08/03/2021 1  Not Estab. % Final    BASOPHILS 08/03/2021 0  Not Estab. % Final    ABS. NEUTROPHILS 08/03/2021 6.0  1.4 - 7.0 x10E3/uL Final    Abs Lymphocytes 08/03/2021 1.7  0.7 - 3.1 x10E3/uL Final    ABS. MONOCYTES 08/03/2021 0.6  0.1 - 0.9 x10E3/uL Final    ABS. EOSINOPHILS 08/03/2021 0.1  0.0 - 0.4 x10E3/uL Final    ABS. BASOPHILS 08/03/2021 0.0  0.0 - 0.2 x10E3/uL Final    IMMATURE GRANULOCYTES 08/03/2021 0  Not Estab. % Final    ABS. IMM. GRANS. 08/03/2021 0.0  0.0 - 0.1 x10E3/uL Final    WBC 08/03/2021 None seen  0 - 5 /hpf Final    RBC 08/03/2021 0-2  0 - 2 /hpf Final    Epithelial cells 08/03/2021 0-10  0 - 10 /hpf Final    Casts 08/03/2021 None seen  None seen /lpf Final    Bacteria 08/03/2021 None seen  None seen/Few Final    INTERPRETATION 08/03/2021 Note   Final    Supplemental report is available.

## 2021-11-16 ENCOUNTER — HOSPITAL ENCOUNTER (OUTPATIENT)
Dept: PHYSICAL THERAPY | Age: 41
Discharge: HOME | End: 2021-11-16
Payer: MEDICAID

## 2021-11-16 ENCOUNTER — APPOINTMENT (OUTPATIENT)
Dept: PHYSICAL THERAPY | Age: 41
End: 2021-11-16

## 2021-11-16 PROCEDURE — 97162 PT EVAL MOD COMPLEX 30 MIN: CPT

## 2021-11-16 NOTE — PROGRESS NOTES
[facility] PHYSICAL THERAPY  [address], [address],[facility], [facility] - Phone: [phone]  Fax: [phone] / [facility]  Patient Name: Stan Pagan : 1980   Medical Diagnosis: Dorsalgia, unspecified [M54.9] Treatment Diagnosis: Dorsalgia, unspecified [M54.9]   Onset Date:      Referral Source: Nisha Ellis MD [facility]): 2021   Prior Hospitalization: See medical history Provider #: [id number]   Prior Level of Function: WNL without limitations   Comorbidities: Back pain, chronicity of symptoms,  Dec 2009, other 3 children born naturally without complications per pt report   Medications: Verified on Patient Summary List   The Plan of Care and following information is based on the information from the initial evaluation.   ===========================================================================================  Assessment / key information:  Patient is 42 y.o. female who presents to [facility], [facility]. with diagnosis of Dorsalgia, unspecified [M54.9]. Pt reports insidious onset upper back pain approx 5 years ago, says that she thinks it coincided with her son's autism diagnosis. Pt is primary caregiver of her 4 children that requires a lot of activity and upright movement. She works in retail a few days a week. She does not currently work out as she does not have the time. Pt reports she has had skilled PT for low back, but not upper back. Objective data detailed below. Patient scored 62 on FOTO indicating decreased function and quality of life. Pt would benefit from skilled PT services to address impairments, work towards goals, and return to PLOF. XR Thoracic Spine 2021: Trace kyphosis and rotoscoliosis of the thoracic spine. Degenerative disc disease of the lower cervical spine partially imaged. MRI Thoracic Spine :  Unremarkable per pt report    Pain: current 3-4/10, at worst 9/10, at best 0/10  Aggravating factors: twisting with lifting (happened twice working retail ~2017, which resulted in her going to ED - she got an injection, which helped a lot),  taking care of children (pt a mother of 4, one of whom has been diagnosed with autism)  Alleviating factors: Injection in thoracic spine ~2017, stretching, pain killers, propping up with pillows (currently sleeps with 3), MD has suggested upper back brace but she does not currently wear it regularly  Pain description: electric shock, catching, breaking  Pain location: between shoulder blades  Radiation? Numbness/tingling? Sometimes central where site of pain is located    Cervical AROM: flex 37, ext 40 pain CT junc, SB R 32 L 40 with discomfort t/o, rot grossly WFL - pain/discomfort reported t/o  UE Movement Analysis: grossly WNL bilat with reports of inc thoracic discomfort during extension biased UE  movements  Static posture: FH, rounded shoulders bilaterally  GHJ MMT: flex R 4- L 4-, ABD R 4- L 4-, ER R 4- L 4-, IR R 5 L 5, rhomboids/LT grossly 3+ bilat  Elbow MMT: grossly WNL bilat  Soft touch: grossly intact bilat  Palpation: TTP and inc tone bilat UT/lev scap, rhomboids, mid/lower traps; discomfort during PA mobs entirety thoracic spine  ===========================================================================================  Eval Complexity: History MEDIUM  Complexity : 1-2 comorbidities / personal factors will impact the outcome/ POC ;  Examination  MEDIUM Complexity : 3 Standardized tests and measures addressing body structure, function, activity limitation and / or participation in recreation ; Presentation MEDIUM Complexity : Evolving with changing characteristics ;   Decision Making MEDIUM Complexity : FOTO score of 26-74; Overall Complexity MEDIUM  Problem List: pain affecting function, decrease ROM, decrease strength, edema affecting function, impaired gait/ balance, decrease ADL/ functional abilitiies, decrease activity tolerance, decrease flexibility/ joint mobility and decrease transfer abilities   Treatment Plan may include any combination of the following: Therapeutic exercise, Therapeutic activities, Neuromuscular re-education, Physical agent/modality, Manual therapy, Patient education, Self Care training and Functional mobility training  Patient / Family readiness to learn indicated by: asking questions, trying to perform skills and interest  Persons(s) to be included in education: patient (P)  Barriers to Learning/Limitations: None  Measures taken, if barriers to learning:    Patient Goal (s): \"Decrease pain\"   Patient self reported health status: good  Rehabilitation Potential: good   Short Term Goals: To be accomplished in 4 weeks:  1) Pt performing HEP as prescribed to facilitate appointment carry over. 2) Patient will report 30% improvement in symptoms during upright activity, childcare, work duties. 3) Patient to demo cervical AROM flex >45, ext >50, SB >45 bilat without inc in cervical or thoracic discomfort in order to have improved functional mobility. 4) Increase FOTO score to 62 indicating improved function and QOL. 5) Pt to report >=+3 on GROC indicating clinically significant subjective functional improvement.  Long Term Goals: To be accomplished in 6 weeks:  1) Patient Independent with progressive HEP to facilitate symptom management and progression upon DC. 2) Increase FOTO score to 68 indicating improved function and QOL. 3) Patient to demo proper DNF activation and control in order to have improved cervical stabilization during functional tasks. 4) Patient to demo bilat lower trap, rhomboids, bilat GHJ flex/ABD MMT >=4+ in order to have improved functional mobility. 5) Pt to report >=+5 on GROC indicating clinically significant subjective functional improvement.   Frequency / Duration: Patient to be seen  1-2  times per week for 6 weeks:  Patient / Caregiver education and instruction: exercises and other PT diagnosis, prognosis, POC  Therapist Signature: Ramya Laceylock, PT Date: 67/44/7876   Certification Period: na Time: 8:52 AM   ===========================================================================================  I certify that the above Physical Therapy Services are being furnished while the patient is under my care. I agree with the treatment plan and certify that this therapy is necessary. To ensure your patient receives the highest quality care and to avoid disruption in therapy please sign and return this plan of care within 21 days. Per Medicaid guidelines if the plan of care is not received within 21 days the patient's care must be put on hold until signed. Physician Signature:        Date:       Time:                                        Sacha Mccauley MD    Please sign and return to [facility] [place]. or you may fax the signed copy to [phone]. Thank you.

## 2021-11-16 NOTE — PROGRESS NOTES
PHYSICAL THERAPY - DAILY TREATMENT NOTE    Patient Name: Leonard Patient        Date: 2021  : 1980   yes Patient  Verified  Visit #:      12  Insurance: Payor: [employer] MEDICAID / Plan: [employer] [place] [employer] PLUS / Product Type: Managed Care Medicaid /      In time: 346 am Out time: 929 am   Total Treatment Time: 44     Medicare/BCBS Time Tracking (below)   Total Timed Codes (min):  7 1:1 Treatment Time:  7     TREATMENT AREA =  Dorsalgia, unspecified [M54.9]    SUBJECTIVE  Pain Level (on 0 to 10 scale):  3-4 / 10   Medication Changes/New allergies or changes in medical history, any new surgeries or procedures?    no  If yes, update Summary List   Subjective Functional Status/Changes:  []  No changes reported     See POC       OBJECTIVE  7 min Self Care: See pt education   Rationale:    Pt education to improve the patient's ability to adhere to PT POC    Billed With/As:  [] TE  [] TA  [] Neuro  [x] Self Care Patient Education: [x] Review HEP    [] Progressed/Changed HEP based on:   [] positioning   [] body mechanics   [] transfers   [] heat/ice application    [x] other: PT diagnosis, prognosis, POC     Other Objective/Functional Measures:    See POC     Post Treatment Pain Level (on 0 to 10) scale:   5  / 10     ASSESSMENT  Assessment/Changes in Function:     Justification for Eval Code Complexity:  Patient History: Back pain, chronicity of symptoms,  Dec 2009, other 3 children born naturally without complications per pt report  Examination: See exam  Clinical Presentation: evolving  Clinical Decision Making: MEDIUM  FOTO: 62 /100     []  See Progress Note/Recertification   Patient will continue to benefit from skilled PT services to modify and progress therapeutic interventions, address functional mobility deficits, address ROM deficits, address strength deficits, analyze and address soft tissue restrictions, analyze and cue movement patterns, analyze and modify body mechanics/ergonomics and assess and modify postural abnormalities to attain remaining goals. Progress toward goals / Updated goals:    See POC     PLAN  [x]  Upgrade activities as tolerated yes Continue plan of care   []  Discharge due to :    []  Other:      Therapist: Rosa Maria Atkins PT    Date: 11/16/2021 Time: 8:30 AM     No future appointments.

## 2021-11-19 DIAGNOSIS — M54.6 CHRONIC BILATERAL THORACIC BACK PAIN: ICD-10-CM

## 2021-11-19 DIAGNOSIS — G89.29 CHRONIC BILATERAL THORACIC BACK PAIN: ICD-10-CM

## 2021-11-19 DIAGNOSIS — M50.30 DDD (DEGENERATIVE DISC DISEASE), CERVICAL: ICD-10-CM

## 2021-11-19 DIAGNOSIS — M40.00 KYPHOSIS (ACQUIRED) (POSTURAL): ICD-10-CM

## 2021-11-19 DIAGNOSIS — M41.80 ROTOSCOLIOSIS: ICD-10-CM

## 2021-12-13 ENCOUNTER — HOSPITAL ENCOUNTER (OUTPATIENT)
Dept: PHYSICAL THERAPY | Age: 41
Discharge: HOME | End: 2021-12-13
Payer: MEDICAID

## 2021-12-13 PROCEDURE — 97110 THERAPEUTIC EXERCISES: CPT

## 2021-12-13 PROCEDURE — 97535 SELF CARE MNGMENT TRAINING: CPT

## 2021-12-13 PROCEDURE — 97112 NEUROMUSCULAR REEDUCATION: CPT

## 2021-12-13 NOTE — PROGRESS NOTES
PHYSICAL THERAPY - DAILY TREATMENT NOTE    Patient Name: Erika Alert        Date: 2021  : 1980   yes Patient  Verified  Visit #:   2   of   12  Insurance: Payor: Alvarez Christie / Plan: [facility] HEALTHKEEPERS PLUS / Product Type: Managed Care Medicaid /      In time: 717 Out time: 037   Total Treatment Time: 41     Medicare/BCBS Time Tracking (below)   Total Timed Codes (min):  41 1:1 Treatment Time:  41     TREATMENT AREA =  Other dorsalgia [M54.89]    SUBJECTIVE  Pain Level (on 0 to 10 scale):  3  / 10   Medication Changes/New allergies or changes in medical history, any new surgeries or procedures?    no  If yes, update Summary List   Subjective Functional Status/Changes:  []  No changes reported     Patient says she feels pretty good but she usually feels worse when she first wakes up. She stated she has not started her HEP yet. OBJECTIVE  20 min Therapeutic Exercise:  [x]  See flow sheet   Rationale:      increase ROM and increase strength to improve the patients ability to rotate cervical spine for driving and other ADLs. 8 min Neuromuscular Re-ed: [x]  See flow sheet   Rationale:    increase ROM, improve coordination and increase proprioception to improve the patients ability to increase mobility and proper sequencing with contraction. 13 min Self Care: Reviewed new HEP   Rationale:    increase ROM, increase strength, improve coordination and increase proprioception to improve the patients ability to facilitate carryover between sessions. Billed With/As:   [x] TE   [] TA   [x] Neuro   [x] Self Care Patient Education: [x] Review HEP    [x] Progressed/Changed HEP based on:   [x] positioning   [x] body mechanics   [] transfers   [] heat/ice application    [] other:      Other Objective/Functional Measures:    FHP posture in standing and sitting.      Post Treatment Pain Level (on 0 to 10) scale:   0  / 10     ASSESSMENT  Assessment/Changes in Function:     Patient tolerated all drills today. She was able to decrease symptoms by end of session - 0/10 pain. She does need consistent cueing to maintain form during exercises. She would benefit from continued skilled PT to address strength and mobility deficits. []  See Progress Note/Recertification   Patient will continue to benefit from skilled PT services to modify and progress therapeutic interventions, address functional mobility deficits, address ROM deficits, address strength deficits, analyze and cue movement patterns, analyze and modify body mechanics/ergonomics, assess and modify postural abnormalities and instruct in home and community integration to attain remaining goals. Progress toward goals / Updated goals:    Patient has not yet met any goals.      PLAN  [x]  Upgrade activities as tolerated yes Continue plan of care   []  Discharge due to :    []  Other:      Therapist: Rufina Spear PTA    Date: 12/13/2021 Time: 6:24 PM     Future Appointments   Date Time Provider Hema Flannery   12/20/2021  6:00 PM Yossi Banegas PT [facility]   12/22/2021  6:00 PM Savanah Avina [facility]   12/27/2021  6:00 PM Yossi Banegas PT [facility]   12/28/2021  5:15 PM Yossi Banegas PT [facility]

## 2021-12-20 ENCOUNTER — HOSPITAL ENCOUNTER (OUTPATIENT)
Dept: PHYSICAL THERAPY | Age: 41
Discharge: HOME | End: 2021-12-20
Payer: MEDICAID

## 2021-12-20 PROCEDURE — 97110 THERAPEUTIC EXERCISES: CPT

## 2021-12-20 PROCEDURE — 97112 NEUROMUSCULAR REEDUCATION: CPT

## 2021-12-20 NOTE — PROGRESS NOTES
[facility] THERAPY  [address] [facility], [facility] [address],[facility], [facility] - Phone: [phone]  Fax: [phone]  PROGRESS NOTE  Patient Name: Anna Marie Jean : 1980   Treatment/Medical Diagnosis: Other dorsalgia [M54.89]   Referral Source: Debo Duncan MD     Date of Initial Visit: 21 Attended Visits: 3 Missed Visits: 0     SUMMARY OF TREATMENT  Pt was seen for IE and 2 f/u visits with treatment consisting of therapeutic exercise, neuromuscular reeducation and self care techniques to improve core stability and strength along with instruction of HEP. CURRENT STATUS  Pt has made inconsistent progress in PT likely due to limited appointments. Pt notes max pain levels at 8/10 and average pain level of 3-4/10. Pt reports 10% improvement since beginning therapy. Pt notes sleeping with her autistic son causes a lot of pain along with prolonged UE use. Pt notes they are completing their HEP at least once a day. Pt will continue to benefit from skilled PT to progress exercises and improve upon posterior musculature and posture. Measures and Goals  1) Pt performing HEP as prescribed to facilitate appointment carry over. Status at eval: HEP initiated  Current Status: inconsistently   2) Patient will report 30% improvement in symptoms during upright activity, childcare, work duties. Status at eval: goal established Current Status: 10%  3) Patient to demo cervical AROM flex >45, ext >50, SB >45 bilat without inc in cervical or thoracic discomfort in order to have improved functional mobility. Status at eval: Flexion 37, ext 40 with pain, SB R 32 L 40 with pain  Current Status: Flexion 69, Ext 39 with pain, SB R 31 L 40 with pain, however less  4) Increase FOTO score to 62 indicating improved function and QOL. Status at eval: 57   Current Status: NT  5) Pt to report >=+3 on GROC indicating clinically significant subjective functional improvement.   Status at eval: goal established Current Status: NT      New Goals to be achieved in __6__  weeks:  1) Patient Independent with progressive HEP to facilitate symptom management and progression upon DC. 2) Increase FOTO score to 68 indicating improved function and QOL. 3) Patient to demo proper DNF activation and control in order to have improved cervical stabilization during functional tasks. 4) Patient to demo bilat lower trap, rhomboids, bilat GHJ flex/ABD MMT >=4+ in order to have improved functional mobility. 5) Pt to report >=+5 on GROC indicating clinically significant subjective functional improvement. RECOMMENDATIONS  Continue with therapy 2x/week for 6 weeks to further improve upon thoracic mobility, posterior strength and functional activities. Please advise. Thank you. If you have any questions/comments please contact us directly at [phone]. Thank you for allowing us to assist in the care of your patient. Therapist Signature: Tj Finley PT, DPT Date: 12/20/2021     Time: 6:58 PM   NOTE TO PHYSICIAN:  PLEASE COMPLETE THE ORDERS BELOW AND FAX TO   [facility]: [phone]  If you are unable to process this request in 24 hours please contact our office: [phone]    ___ I have read the above report and request that my patient continue as recommended.   ___ I have read the above report and request that my patient continue therapy with the following changes/special instructions:_________________________________________________________   ___ I have read the above report and request that my patient be discharged from therapy.      Physician Signature:        Date:       Time:                                 Toy Wills MD

## 2021-12-20 NOTE — PROGRESS NOTES
PHYSICAL THERAPY - DAILY TREATMENT NOTE    Patient Name: Arsh Ambriz        Date: 2021  : 1980   yes Patient  Verified  Visit #:   3   of   12  Insurance: Payor: Eugenia Sun / Plan: [facility] HEALTHKEEPERS PLUS / Product Type: Managed Care Medicaid /      In time: 998 Out time: 172   Total Treatment Time: 46     Medicare/BCBS Time Tracking (below)   Total Timed Codes (min):  23 1:1 Treatment Time:  23     TREATMENT AREA =  Other dorsalgia [M54.89]    SUBJECTIVE  Pain Level (on 0 to 10 scale):  6  / 10   Medication Changes/New allergies or changes in medical history, any new surgeries or procedures?    no  If yes, update Summary List   Subjective Functional Status/Changes:  []  No changes reported     Notes she has a lot of pain in the back today. OBJECTIVE  21 (8min 1:1) min Therapeutic Exercise:  [x]  See flow sheet   Rationale:      increase ROM and increase strength to improve the patients ability to rotate cervical spine for driving and other ADLs. 15 1:1 min Neuromuscular Re-ed: [x]  See flow sheet   Rationale:    increase ROM, improve coordination and increase proprioception to improve the patients ability to increase mobility and proper sequencing with contraction. 10 min Self Care: NB Reviewed HEP, POC and diagnosis, POC moving forward   Rationale:    increase ROM, increase strength, improve coordination and increase proprioception to improve the patients ability to facilitate carryover between sessions. Billed With/As:   [x] TE   [] TA   [x] Neuro   [x] Self Care Patient Education: [x] Review HEP    [x] Progressed/Changed HEP based on:   [x] positioning   [x] body mechanics   [] transfers   [] heat/ice application    [] other:      Other Objective/Functional Measures:    See flow sheet for exercises performed. Pt with decreased thoracic mobility in all directions.      Post Treatment Pain Level (on 0 to 10) scale:   7  / 10 ASSESSMENT  Assessment/Changes in Function:     See PN     [x]  See Progress Note/Recertification   Patient will continue to benefit from skilled PT services to modify and progress therapeutic interventions, address functional mobility deficits, address ROM deficits, address strength deficits, analyze and cue movement patterns, analyze and modify body mechanics/ergonomics, assess and modify postural abnormalities and instruct in home and community integration to attain remaining goals. Progress toward goals / Updated goals:    Patient has not yet met any goals.      PLAN  [x]  Upgrade activities as tolerated yes Continue plan of care   []  Discharge due to :    []  Other:      Therapist: Antonia Reed PT    Date: 12/20/2021 Time: 6:24 PM     Future Appointments   Date Time Provider Hema Flannery   12/20/2021  6:00 PM Angy Holland, PT [address]   12/22/2021  6:00 PM Martín Beth [address] [facility]   12/27/2021  6:00 PM Angy Holland,  [address] [facility]   12/28/2021  5:15 PM Angy Holland,  [address] [facility]

## 2021-12-22 ENCOUNTER — HOSPITAL ENCOUNTER (OUTPATIENT)
Dept: PHYSICAL THERAPY | Age: 41
Discharge: HOME | End: 2021-12-22
Payer: MEDICAID

## 2021-12-22 PROCEDURE — 97112 NEUROMUSCULAR REEDUCATION: CPT

## 2021-12-22 PROCEDURE — 97140 MANUAL THERAPY 1/> REGIONS: CPT

## 2021-12-22 PROCEDURE — 97110 THERAPEUTIC EXERCISES: CPT

## 2021-12-22 NOTE — PROGRESS NOTES
PHYSICAL THERAPY - DAILY TREATMENT NOTE      Patient Name: Stan Pagan        Date: 2021  : 1980   YES Patient  Verified  Visit #:      12  Insurance: Payor: [employer] / Plan: [facility] HEALTHKEEPERS PLUS / Product Type: Managed Care Medicaid /      In time: 6:05 Out time: 6:55   Total Treatment Time: 50     Medicare/BCBS Time Tracking (below)   Total Timed Codes (min):  40 1:1 Treatment Time:  40     TREATMENT AREA =  Other dorsalgia [M54.89]    SUBJECTIVE    Pain Level (on 0 to 10 scale):  6  / 10   Medication Changes/New allergies or changes in medical history, any new surgeries or procedures?     NO    If yes, update Summary List   Subjective Functional Status/Changes:  []  No changes reported       Functional improvements: none reported  Functional impairments: pain with ADLs, lifting, sleep positioning         OBJECTIVE  Modalities Rationale:     decrease pain to improve patient's ability to perform ADLs   min [] Estim, type/location:                                      []  att     []  unatt     []  w/US     []  w/ice    []  w/heat    min []  Mechanical Traction: type/lbs                   []  pro   []  sup   []  int   []  cont    []  before manual    []  after manual    min []  Ultrasound, settings/location:      min []  Iontophoresis w/ dexamethasone, location:                                               []  take home patch       []  in clinic   10 min []  Ice     [x]  Heat    location/position: Supine thoracic/lumbar    min []  Vasopneumatic Device, press/temp:        min []  Other:    [x] Skin assessment post-treatment (if applicable):    []  intact    [x]  redness- no adverse reaction                  []redness - adverse reaction:      10 min Therapeutic Exercise:  [x]  See flow sheet   Rationale:      increase ROM and increase strength to improve the patients ability to perform ADLs     10 min Neuromuscular Re-ed:    Rationale:      increase ROM and increase strength to improve the patients ability to perform ADLs     10 min Manual Therapy: Technique:      [] S/DTM []IASTM []PROM [] Passive Stretching   []manual TPR    []Jt manipulation:Gr I [] II []  III [] IV[] V[]  Treatment Area:  Left lumbar and psoas release; thoracic and lumbar paraspinal DTM   Rationale:      decrease pain, increase ROM and increase tissue extensibility to improve patient's ability to perform ADLs    10 min Self Care: Educated on posture, core engagement, body mechanics, sleep positioning to decrease spine pain   Rationale:    increase ROM and increase strength to improve the patients ability to perform ADLs    Billed With/As:   [x] TE   [] TA   [] Neuro   [] Self Care Patient Education: [x] Review HEP    [] Progressed/Changed HEP based on:   [] positioning   [] body mechanics   [] transfers   [] heat/ice application    [] other:      Other Objective/Functional Measures:    Patient reported increased left thoracic and lumbar pain after last visit. Increased tone left psoas with right lower thoracic rotation. Hold on spinal flexion based therex. Post Treatment Pain Level (on 0 to 10) scale:   4  / 10     ASSESSMENT    Assessment/Changes in Function:     Patient reporting increased left thoracic and lumbar pain of unknown origin. Reduced post treatment. []  See Progress Note/Recertification   Patient will continue to benefit from skilled PT services to modify and progress therapeutic interventions, address functional mobility deficits, address ROM deficits, address strength deficits, analyze and address soft tissue restrictions, analyze and cue movement patterns, analyze and modify body mechanics/ergonomics and assess and modify postural abnormalities to attain remaining goals. to attain remaining goals. Progress toward goals / Updated goals:    Fair Progress to    [] STG    [x] LTG  5 as shown by pain 6/10.      PLAN    [x]  Upgrade activities as tolerated YES Continue plan of care []  Discharge due to :    []  Other:      Therapist: Erendira Nielson PT    Date: 12/22/2021 Time: 6:39 PM     Future Appointments   Date Time Provider Hema Flannery   12/27/2021  6:00 PM [facility] [address] [place] [facility]

## 2021-12-27 ENCOUNTER — APPOINTMENT (OUTPATIENT)
Dept: PHYSICAL THERAPY | Age: 41
End: 2021-12-27
Payer: MEDICAID

## 2021-12-28 ENCOUNTER — APPOINTMENT (OUTPATIENT)
Dept: PHYSICAL THERAPY | Age: 41
End: 2021-12-28
Payer: MEDICAID

## 2022-02-02 ENCOUNTER — APPOINTMENT (OUTPATIENT)
Dept: PHYSICAL THERAPY | Age: 42
End: 2022-02-02

## 2022-02-14 ENCOUNTER — TELEPHONE (OUTPATIENT)
Dept: FAMILY MEDICINE CLINIC | Age: 42
End: 2022-02-14

## 2022-02-14 DIAGNOSIS — R92.8 ABNORMAL MAMMOGRAM: ICD-10-CM

## 2022-02-14 DIAGNOSIS — Z12.31 SCREENING MAMMOGRAM FOR BREAST CANCER: Primary | ICD-10-CM

## 2022-02-18 ENCOUNTER — HOSPITAL ENCOUNTER (OUTPATIENT)
Dept: WOMENS IMAGING | Age: 42
Discharge: HOME | End: 2022-02-18
Attending: LEGAL MEDICINE
Payer: MEDICAID

## 2022-02-18 DIAGNOSIS — Z12.31 SCREENING MAMMOGRAM FOR BREAST CANCER: ICD-10-CM

## 2022-02-18 PROCEDURE — 77067 SCR MAMMO BI INCL CAD: CPT

## 2022-02-18 PROCEDURE — 77063 BREAST TOMOSYNTHESIS BI: CPT

## 2022-02-21 NOTE — PROGRESS NOTES
[facility] PHYSICAL THERAPY  [address] [place] [address], [facility] [address],[facility], [address] [facility] - Phone: [phone]  Fax: [phone]    DISCHARGE NOTE  Patient Name: Liberty Rose : 1980   Treatment/Medical Diagnosis: Other dorsalgia [M54.89]   Referral Source: Linda Varghese MD     Date of Initial Visit: 21 Attended Visits: 4 Missed Visits: 5       SUMMARY OF TREATMENT  Patient attended only initial evaluation and     3     follow-ups and then did not return. Therefore a formal reassessment of goals was not performed. Patient did not attended visit within 30 days of last scheduled appointment and was discharged per policy. RECOMMENDATIONS  Discontinue physical therapy due to patient not returning. If you have any questions/comments please contact us directly at [phone]. Thank you for allowing us to assist in the care of your patient. Therapist Signature: Suyapa Quiñones PT Date: 22     Time: 4:59 PM     NOTE TO PHYSICIAN:  Your patient's insurance requires this discharge note be signed and returned. PLEASE COMPLETE THE ORDERS BELOW AND RETURN TO:  [facility] PHYSICAL THERAPY    ___ I have read the above report and request that my patient be discharged from therapy.      Physician Signature:        Date:       Time:                                        Linda Varghese MD

## 2022-02-22 NOTE — PROGRESS NOTES
Left message for pt that additional imaging has been ordered; any questions/concerns, please call our office.

## 2022-02-24 ENCOUNTER — HOSPITAL ENCOUNTER (OUTPATIENT)
Dept: ULTRASOUND IMAGING | Age: 42
Discharge: HOME | End: 2022-02-24
Attending: LEGAL MEDICINE
Payer: MEDICAID

## 2022-02-24 ENCOUNTER — HOSPITAL ENCOUNTER (OUTPATIENT)
Dept: MAMMOGRAPHY | Age: 42
Discharge: HOME | End: 2022-02-24
Attending: LEGAL MEDICINE
Payer: MEDICAID

## 2022-02-24 DIAGNOSIS — R92.8 ABNORMAL MAMMOGRAM: ICD-10-CM

## 2022-02-24 PROCEDURE — 77061 BREAST TOMOSYNTHESIS UNI: CPT

## 2022-02-24 PROCEDURE — 76642 ULTRASOUND BREAST LIMITED: CPT

## 2022-02-24 NOTE — PROGRESS NOTES
No mammographic or sonographic evidence of malignancy.  -Benign findings as described. -Routine annual screening followup advised.

## 2022-02-28 NOTE — PROGRESS NOTES
Pt was informed of mammogram/dx results via [employer] and has not questions or concerns; recommend annual screening.

## 2022-02-28 NOTE — PROGRESS NOTES
Pt was informed of mammogram/dx results via [employer] and has not questions or concerns; recommend annual screening.

## 2022-03-18 PROBLEM — M54.50 CHRONIC BILATERAL LOW BACK PAIN: Status: ACTIVE | Noted: 2019-01-21

## 2022-03-18 PROBLEM — G89.29 CHRONIC BILATERAL LOW BACK PAIN: Status: ACTIVE | Noted: 2019-01-21

## 2022-03-20 PROBLEM — E55.9 VITAMIN D DEFICIENCY: Status: ACTIVE | Noted: 2019-01-21

## 2022-05-26 ENCOUNTER — TELEPHONE (OUTPATIENT)
Dept: FAMILY MEDICINE CLINIC | Age: 42
End: 2022-05-26

## 2022-05-26 DIAGNOSIS — E55.9 VITAMIN D DEFICIENCY: ICD-10-CM

## 2022-05-26 RX ORDER — ERGOCALCIFEROL 1.25 MG/1
50000 CAPSULE ORAL
Qty: 12 CAPSULE | Refills: 3 | Status: SHIPPED | OUTPATIENT
Start: 2022-05-26 | End: 2022-08-24

## 2022-09-29 ENCOUNTER — OFFICE VISIT (OUTPATIENT)
Dept: FAMILY MEDICINE CLINIC | Age: 42
End: 2022-09-29
Payer: MEDICAID

## 2022-09-29 VITALS
RESPIRATION RATE: 14 BRPM | WEIGHT: 170.8 LBS | HEIGHT: 68 IN | BODY MASS INDEX: 25.88 KG/M2 | DIASTOLIC BLOOD PRESSURE: 60 MMHG | TEMPERATURE: 98.3 F | SYSTOLIC BLOOD PRESSURE: 100 MMHG | OXYGEN SATURATION: 98 % | HEART RATE: 88 BPM

## 2022-09-29 DIAGNOSIS — J32.0 CHRONIC MAXILLARY SINUSITIS: Primary | ICD-10-CM

## 2022-09-29 DIAGNOSIS — G89.29 CHRONIC LOW BACK PAIN WITHOUT SCIATICA, UNSPECIFIED BACK PAIN LATERALITY: ICD-10-CM

## 2022-09-29 DIAGNOSIS — M25.562 LEFT KNEE PAIN, UNSPECIFIED CHRONICITY: ICD-10-CM

## 2022-09-29 DIAGNOSIS — E55.9 VITAMIN D DEFICIENCY: ICD-10-CM

## 2022-09-29 DIAGNOSIS — S89.92XD INJURY OF LEFT KNEE, SUBSEQUENT ENCOUNTER: ICD-10-CM

## 2022-09-29 DIAGNOSIS — M54.50 CHRONIC LOW BACK PAIN WITHOUT SCIATICA, UNSPECIFIED BACK PAIN LATERALITY: ICD-10-CM

## 2022-09-29 DIAGNOSIS — M62.830 MUSCLE SPASM OF BACK: ICD-10-CM

## 2022-09-29 PROCEDURE — 99214 OFFICE O/P EST MOD 30 MIN: CPT | Performed by: LEGAL MEDICINE

## 2022-09-29 NOTE — PROGRESS NOTES
Rancho Jacobs is a 42 y.o. female (: 1980) presenting to address:    Chief Complaint   Patient presents with    Follow-up       Vitals:    22 1501   BP: 100/60   Pulse: 88   Resp: 14   Temp: 98.3 °F (36.8 °C)   TempSrc: Temporal   SpO2: 98%   Weight: 170 lb 12.8 oz (77.5 kg)   Height: 5' 8\" (1.727 m)   PainSc:   2   PainLoc: Knee       Hearing/Vision:   No results found. Learning Assessment:     Learning Assessment 2020   PRIMARY LEARNER Patient   HIGHEST LEVEL OF EDUCATION - PRIMARY LEARNER  4 YEARS OF COLLEGE   BARRIERS PRIMARY LEARNER NONE   CO-LEARNER CAREGIVER No   PRIMARY LANGUAGE OTHER (COMMENT)   LEARNER PREFERENCE PRIMARY READING   ANSWERED BY patient   RELATIONSHIP SELF     Depression Screening:     3 most recent PHQ Screens 2022   Little interest or pleasure in doing things Not at all   Feeling down, depressed, irritable, or hopeless Not at all   Total Score PHQ 2 0     Fall Risk Assessment:     Fall Risk Assessment, last 12 mths 2018   Able to walk? Yes   Fall in past 12 months? No     Abuse Screening:     Abuse Screening Questionnaire 2021   Do you ever feel afraid of your partner? N   Are you in a relationship with someone who physically or mentally threatens you? N   Is it safe for you to go home?  Y     ADL Assessment:     ADL Assessment 2018   Feeding yourself No Help Needed   Getting from bed to chair No Help Needed   Getting dressed No Help Needed   Bathing or showering No Help Needed   Walk across the room (includes cane/walker) No Help Needed   Using the telphone No Help Needed   Taking your medications No Help Needed   Preparing meals No Help Needed   Managing money (expenses/bills) No Help Needed   Moderately strenuous housework (laundry) No Help Needed   Shopping for personal items (toiletries/medicines) No Help Needed   Shopping for groceries No Help Needed   Driving No Help Needed   Climbing a flight of stairs No Help Needed   Getting to places beyond walking distances No Help Needed        Coordination of Care Questionaire:   1. \"Have you been to the ER, urgent care clinic since your last visit? Hospitalized since your last visit? \" No    2. \"Have you seen or consulted any other health care providers outside of the [address] [place] [address] since your last visit? \"  [facility] ER for allergic reaction  from robin dye    3. For patients aged 39-70: Has the patient had a colonoscopy / FIT/ Cologuard? NA - based on age    If the patient is female:    4. For patients aged 41-77: Has the patient had a mammogram within the past 2 years? Yes - no Care Gap present  See top three    5. For patients aged 21-65: Has the patient had a pap smear? Yes - no Care Gap present    Advanced Directive:   1. Do you have an Advanced Directive? NO    2. Would you like information on Advanced Directives? NO    Patient declined flu vaccine.

## 2022-09-29 NOTE — PROGRESS NOTES
Dima Posada     Chief Complaint   Patient presents with    Follow-up     Vitals:    22 1501   BP: 100/60   Pulse: 88   Resp: 14   Temp: 98.3 °F (36.8 °C)   TempSrc: Temporal   SpO2: 98%   Weight: 170 lb 12.8 oz (77.5 kg)   Height: 5' 8\" (1.727 m)   PainSc:   2   PainLoc: Knee         HPI: Dima Posada is here for follow up and to address few issues she is experiencing  She is till having low back pain , not worsening and she is managing with a stretching and occasional analgesics  Also has been having dry cough and irritation her throat Not related to swallowing   , no postnasal drip   Incresed by eating spicy or sweet food , also feels sinus congestion, and episodes of the sneezing and runny nose      Left knee pain for few month she was helping her elderly aunt when her and put put her body weigt on a beer at that time she felt she has sprained her knee since then she has  having pain with siting and bending in th medical side of her left knee . No past medical history on file. Past Surgical History:   Procedure Laterality Date    HX  SECTION       Social History     Tobacco Use    Smoking status: Never    Smokeless tobacco: Never   Substance Use Topics    Alcohol use: No       Family History   Problem Relation Age of Onset    Diabetes Mother     Hypertension Father        Review of Systems   Constitutional:  Negative for chills, fever, malaise/fatigue and weight loss. HENT:  Negative for congestion, ear discharge, ear pain, hearing loss, nosebleeds, sinus pain, sore throat and tinnitus. Throat irritation    Eyes:  Negative for blurred vision, double vision and discharge. Respiratory:  Negative for cough, hemoptysis, sputum production, shortness of breath, wheezing and stridor. Cardiovascular:  Negative for chest pain, palpitations, claudication and leg swelling. Gastrointestinal:  Negative for abdominal pain, constipation, diarrhea, nausea and vomiting.    Genitourinary: Negative for dysuria, frequency and urgency. Musculoskeletal:  Positive for joint pain. Negative for back pain, falls, myalgias and neck pain. Skin:  Negative for itching and rash. Neurological:  Negative for dizziness, tingling, sensory change, speech change, focal weakness, weakness and headaches. Psychiatric/Behavioral:  Negative for depression and suicidal ideas. Physical Exam  Vitals and nursing note reviewed. Constitutional:       General: She is not in acute distress. Appearance: She is well-developed. She is not diaphoretic. HENT:      Head: Normocephalic and atraumatic. Right Ear: Tympanic membrane, ear canal and external ear normal. There is no impacted cerumen. Left Ear: Tympanic membrane, ear canal and external ear normal. There is no impacted cerumen. Ears:      Comments: Bilateral maxillary sinus tenderness     Nose: Nose normal. No congestion or rhinorrhea. Mouth/Throat:      Pharynx: No oropharyngeal exudate or posterior oropharyngeal erythema. Eyes:      General: No scleral icterus. Right eye: No discharge. Left eye: No discharge. Conjunctiva/sclera: Conjunctivae normal.      Pupils: Pupils are equal, round, and reactive to light. Neck:      Thyroid: No thyromegaly. Cardiovascular:      Rate and Rhythm: Normal rate and regular rhythm. Heart sounds: Normal heart sounds. No murmur heard. Pulmonary:      Effort: Pulmonary effort is normal. No respiratory distress. Breath sounds: Normal breath sounds. No wheezing or rales. Chest:      Chest wall: No tenderness. Abdominal:      General: There is no distension. Palpations: Abdomen is soft. Tenderness: There is no abdominal tenderness. There is no rebound. Musculoskeletal:         General: Tenderness present. No deformity. Normal range of motion.       Comments: Tenderness is medial aspect of the right knee possible lateral ligament sprain   Lymphadenopathy: Cervical: No cervical adenopathy. Skin:     General: Skin is warm and dry. Coloration: Skin is not pale. Findings: No erythema or rash. Neurological:      Mental Status: She is alert and oriented to person, place, and time. Cranial Nerves: No cranial nerve deficit. Coordination: Coordination normal.   Psychiatric:         Behavior: Behavior normal.         Thought Content: Thought content normal.         Judgment: Judgment normal.        Assessment and plan     Plan of care has been discussed with the patient, he agrees to the plan and verbalized understanding. All his questions were answered  More than 50% of the time spent in this visit was counseling the patient about  illness and treatment options         1. Muscle spasm of back  Is stable on current regular stretching    2. Chronic low back pain without sciatica, unspecified back pain stable    3. Vitamin D deficiency  She is not adherent regular to vitamin D supplements    4. Chronic maxillary sinusitis  Advised patient to take antihistamine over-the-counter  We will wait for the x-ray results  - XR SINUSES PARANASAL MIN 3 V; Future    5. Left knee pain, unspecified chronicity  Possible ligament sprain rule out DJD   - XR KNEE LT 3 V; Future  Current Outpatient Medications   Medication Sig Dispense Refill    ergocalciferol (Vitamin D2) 1,250 mcg (50,000 unit) capsule Take 1 Capsule by mouth every seven (7) days for 90 days.  12 Capsule 3       Patient Active Problem List    Diagnosis Date Noted    Chronic bilateral low back pain 01/21/2019    Vitamin D deficiency 01/21/2019     Results for orders placed or performed in visit on 08/03/21   URINALYSIS W/ RFLX MICROSCOPIC   Result Value Ref Range    Specific Gravity 1.023 1.005 - 1.030    pH (UA) 5.5 5.0 - 7.5    Color Yellow Yellow    Appearance Clear Clear    Leukocyte Esterase Negative Negative    Protein Negative Negative/Trace    Glucose Negative Negative    Ketone Negative Negative Blood 1+ (A) Negative    Bilirubin Negative Negative    Urobilinogen 0.2 0.2 - 1.0 mg/dL    Nitrites Negative Negative    Microscopic Examination See additional order    TSH AND FREE T4   Result Value Ref Range    TSH 1.440 0.450 - 4.500 uIU/mL    T4, Free 1.21 0.82 - 1.77 ng/dL   VITAMIN D, 25 HYDROXY   Result Value Ref Range    VITAMIN D, 25-HYDROXY 21.7 (L) 30.0 - 100.0 ng/mL   HEMOGLOBIN A1C W/O EAG   Result Value Ref Range    Hemoglobin A1c 4.6 (L) 4.8 - 5.6 %   LIPID PANEL   Result Value Ref Range    Cholesterol, total 125 100 - 199 mg/dL    Triglyceride 33 0 - 149 mg/dL    HDL Cholesterol 45 >39 mg/dL    VLDL, calculated 9 5 - 40 mg/dL    LDL, calculated 71 0 - 99 mg/dL   METABOLIC PANEL, COMPREHENSIVE   Result Value Ref Range    Glucose 100 (H) 65 - 99 mg/dL    BUN 10 6 - 24 mg/dL    Creatinine 0.45 (L) 0.57 - 1.00 mg/dL    GFR est non- >59 mL/min/1.73    GFR est  >59 mL/min/1.73    BUN/Creatinine ratio 22 9 - 23    Sodium 139 134 - 144 mmol/L    Potassium 3.8 3.5 - 5.2 mmol/L    Chloride 104 96 - 106 mmol/L    CO2 24 20 - 29 mmol/L    Calcium 9.3 8.7 - 10.2 mg/dL    Protein, total 6.6 6.0 - 8.5 g/dL    Albumin 4.1 3.8 - 4.8 g/dL    GLOBULIN, TOTAL 2.5 1.5 - 4.5 g/dL    A-G Ratio 1.6 1.2 - 2.2    Bilirubin, total 0.4 0.0 - 1.2 mg/dL    Alk. phosphatase 56 48 - 121 IU/L    AST (SGOT) 10 0 - 40 IU/L    ALT (SGPT) 7 0 - 32 IU/L   CBC WITH AUTOMATED DIFF   Result Value Ref Range    WBC 8.4 3.4 - 10.8 x10E3/uL    RBC 3.94 3.77 - 5.28 x10E6/uL    HGB 11.7 11.1 - 15.9 g/dL    HCT 35.2 34.0 - 46.6 %    MCV 89 79 - 97 fL    MCH 29.7 26.6 - 33.0 pg    MCHC 33.2 31.5 - 35.7 g/dL    RDW 11.7 11.7 - 15.4 %    PLATELET 424 987 - 059 x10E3/uL    NEUTROPHILS 71 Not Estab. %    Lymphocytes 21 Not Estab. %    MONOCYTES 7 Not Estab. %    EOSINOPHILS 1 Not Estab. %    BASOPHILS 0 Not Estab. %    ABS. NEUTROPHILS 6.0 1.4 - 7.0 x10E3/uL    Abs Lymphocytes 1.7 0.7 - 3.1 x10E3/uL    ABS.  MONOCYTES 0.6 0.1 - 0.9 x10E3/uL    ABS. EOSINOPHILS 0.1 0.0 - 0.4 x10E3/uL    ABS. BASOPHILS 0.0 0.0 - 0.2 x10E3/uL    IMMATURE GRANULOCYTES 0 Not Estab. %    ABS. IMM. GRANS. 0.0 0.0 - 0.1 x10E3/uL   MICROSCOPIC EXAMINATION   Result Value Ref Range    WBC None seen 0 - 5 /hpf    RBC 0-2 0 - 2 /hpf    Epithelial cells 0-10 0 - 10 /hpf    Casts None seen None seen /lpf    Bacteria None seen None seen/Few   CVD REPORT   Result Value Ref Range    INTERPRETATION Note      No visits with results within 3 Month(s) from this visit. Latest known visit with results is:   Appointment on 08/03/2021   Component Date Value Ref Range Status    Specific Gravity 08/03/2021 1.023  1.005 - 1.030 Final    pH (UA) 08/03/2021 5.5  5.0 - 7.5 Final    Color 08/03/2021 Yellow  Yellow Final    Appearance 08/03/2021 Clear  Clear Final    Leukocyte Esterase 08/03/2021 Negative  Negative Final    Protein 08/03/2021 Negative  Negative/Trace Final    Glucose 08/03/2021 Negative  Negative Final    Ketone 08/03/2021 Negative  Negative Final    Blood 08/03/2021 1+ (A)  Negative Final    Bilirubin 08/03/2021 Negative  Negative Final    Urobilinogen 08/03/2021 0.2  0.2 - 1.0 mg/dL Final    Nitrites 08/03/2021 Negative  Negative Final    Microscopic Examination 08/03/2021 See additional order   Final    Microscopic was indicated and was performed. TSH 08/03/2021 1.440  0.450 - 4.500 uIU/mL Final    T4, Free 08/03/2021 1.21  0.82 - 1.77 ng/dL Final    VITAMIN D, 25-HYDROXY 08/03/2021 21.7 (A)  30.0 - 100.0 ng/mL Final    Comment: Vitamin D deficiency has been defined by the [address] practice guideline as a  level of serum 25-OH vitamin D less than 20 ng/mL (1,2). The Endocrine Society went on to further define vitamin D  insufficiency as a level between 21 and 29 ng/mL (2). 1. IOM ([place] of Medicine). 2010. Dietary reference     intakes for calcium and D. [facility]: The     [employer].   2. Shreyas NIÑO, Suraj NC, Aniceto BATRES, et al.     Evaluation, treatment, and prevention of vitamin D     deficiency: an Endocrine Society clinical practice     guideline. JCEM. 2011 Jul; 96(7):1911-30. Hemoglobin A1c 08/03/2021 4.6 (A)  4.8 - 5.6 % Final    Comment:          Prediabetes: 5.7 - 6.4           Diabetes: >6.4           Glycemic control for adults with diabetes: <7.0      Cholesterol, total 08/03/2021 125  100 - 199 mg/dL Final    Triglyceride 08/03/2021 33  0 - 149 mg/dL Final    HDL Cholesterol 08/03/2021 45  >39 mg/dL Final    VLDL, calculated 08/03/2021 9  5 - 40 mg/dL Final    LDL, calculated 08/03/2021 71  0 - 99 mg/dL Final    Glucose 08/03/2021 100 (A)  65 - 99 mg/dL Final    BUN 08/03/2021 10  6 - 24 mg/dL Final    Creatinine 08/03/2021 0.45 (A)  0.57 - 1.00 mg/dL Final    GFR est non-AA 08/03/2021 125  >59 mL/min/1.73 Final    GFR est AA 08/03/2021 144  >59 mL/min/1.73 Final    Comment: **[facility] currently reports eGFR in compliance with the current**    recommendations of the [employer]. [facility] will    update reporting as new guidelines are published from the NKF-ASN    Task force. BUN/Creatinine ratio 08/03/2021 22  9 - 23 Final    Sodium 08/03/2021 139  134 - 144 mmol/L Final    Potassium 08/03/2021 3.8  3.5 - 5.2 mmol/L Final    Chloride 08/03/2021 104  96 - 106 mmol/L Final    CO2 08/03/2021 24  20 - 29 mmol/L Final    Calcium 08/03/2021 9.3  8.7 - 10.2 mg/dL Final    Protein, total 08/03/2021 6.6  6.0 - 8.5 g/dL Final    Albumin 08/03/2021 4.1  3.8 - 4.8 g/dL Final    GLOBULIN, TOTAL 08/03/2021 2.5  1.5 - 4.5 g/dL Final    A-G Ratio 08/03/2021 1.6  1.2 - 2.2 Final    Bilirubin, total 08/03/2021 0.4  0.0 - 1.2 mg/dL Final    Alk.  phosphatase 08/03/2021 56  48 - 121 IU/L Final    AST (SGOT) 08/03/2021 10  0 - 40 IU/L Final    ALT (SGPT) 08/03/2021 7  0 - 32 IU/L Final    WBC 08/03/2021 8.4  3.4 - 10.8 x10E3/uL Final    RBC 08/03/2021 3.94  3.77 - 5.28 x10E6/uL Final    HGB 08/03/2021 11.7  11.1 - 15.9 g/dL Final    HCT 08/03/2021 35.2  34.0 - 46.6 % Final    MCV 08/03/2021 89  79 - 97 fL Final    MCH 08/03/2021 29.7  26.6 - 33.0 pg Final    MCHC 08/03/2021 33.2  31.5 - 35.7 g/dL Final    RDW 08/03/2021 11.7  11.7 - 15.4 % Final    PLATELET 91/71/0807 537  150 - 450 x10E3/uL Final    NEUTROPHILS 08/03/2021 71  Not Estab. % Final    Lymphocytes 08/03/2021 21  Not Estab. % Final    MONOCYTES 08/03/2021 7  Not Estab. % Final    EOSINOPHILS 08/03/2021 1  Not Estab. % Final    BASOPHILS 08/03/2021 0  Not Estab. % Final    ABS. NEUTROPHILS 08/03/2021 6.0  1.4 - 7.0 x10E3/uL Final    Abs Lymphocytes 08/03/2021 1.7  0.7 - 3.1 x10E3/uL Final    ABS. MONOCYTES 08/03/2021 0.6  0.1 - 0.9 x10E3/uL Final    ABS. EOSINOPHILS 08/03/2021 0.1  0.0 - 0.4 x10E3/uL Final    ABS. BASOPHILS 08/03/2021 0.0  0.0 - 0.2 x10E3/uL Final    IMMATURE GRANULOCYTES 08/03/2021 0  Not Estab. % Final    ABS. IMM. GRANS. 08/03/2021 0.0  0.0 - 0.1 x10E3/uL Final    WBC 08/03/2021 None seen  0 - 5 /hpf Final    RBC 08/03/2021 0-2  0 - 2 /hpf Final    Epithelial cells 08/03/2021 0-10  0 - 10 /hpf Final    Casts 08/03/2021 None seen  None seen /lpf Final    Bacteria 08/03/2021 None seen  None seen/Few Final    INTERPRETATION 08/03/2021 Note   Final    Supplemental report is available. Follow-up and Dispositions    Return for for annual physical, as per results.

## 2022-10-14 ENCOUNTER — TELEPHONE (OUTPATIENT)
Dept: FAMILY MEDICINE CLINIC | Age: 42
End: 2022-10-14

## 2022-10-25 DIAGNOSIS — J32.0 CHRONIC MAXILLARY SINUSITIS: Primary | ICD-10-CM

## 2022-10-30 DIAGNOSIS — M25.562 LEFT KNEE PAIN, UNSPECIFIED CHRONICITY: ICD-10-CM

## 2022-10-30 DIAGNOSIS — J01.91 ACUTE RECURRENT SINUSITIS, UNSPECIFIED LOCATION: Primary | ICD-10-CM

## 2022-10-30 DIAGNOSIS — J32.9 CHRONIC SINUSITIS, UNSPECIFIED LOCATION: ICD-10-CM

## 2022-10-30 DIAGNOSIS — M22.42 PATELLOFEMORAL CHONDROSIS OF LEFT KNEE: ICD-10-CM

## 2022-10-30 RX ORDER — AMOXICILLIN AND CLAVULANATE POTASSIUM 875; 125 MG/1; MG/1
1 TABLET, FILM COATED ORAL EVERY 12 HOURS
Qty: 20 TABLET | Refills: 0 | Status: SHIPPED | OUTPATIENT
Start: 2022-10-30 | End: 2022-11-09

## 2022-10-30 NOTE — PROGRESS NOTES
CT of sinuses showed acute on chronic sinusitis patient to be treated with antibiotics and referral to ENT    MRI of knee showeLow-grade tricompartmental chondrosis.    Which is deterioration of the cartilage will be referred to orthopedics

## 2022-11-18 ENCOUNTER — OFFICE VISIT (OUTPATIENT)
Dept: SPORTS MEDICINE | Age: 42
End: 2022-11-18
Payer: MEDICAID

## 2022-11-18 DIAGNOSIS — G89.29 CHRONIC PAIN OF LEFT KNEE: ICD-10-CM

## 2022-11-18 DIAGNOSIS — S83.412A SPRAIN OF MEDIAL COLLATERAL LIGAMENT OF LEFT KNEE, INITIAL ENCOUNTER: Primary | ICD-10-CM

## 2022-11-18 DIAGNOSIS — M25.562 CHRONIC PAIN OF LEFT KNEE: ICD-10-CM

## 2022-11-18 PROCEDURE — 99203 OFFICE O/P NEW LOW 30 MIN: CPT | Performed by: FAMILY MEDICINE

## 2022-11-18 NOTE — PROGRESS NOTES
[facility]  Sports Medicine Consultation Note    PCP: Bjorn Lim MD  Requesting provider: Bjorn Lim MD       Katy Lanes is a 36 y.o. female (: 1980) presenting to obtain consultative services regarding:  Chief Complaint   Patient presents with    Knee Pain     Left       Assessment/Plan:       ICD-10-CM ICD-9-CM   1. Sprain of medial collateral ligament of left knee, initial encounter  S83.412A 844.1   2. Chronic pain of left knee  M25.562 719.46    G89.29 338.29       Discussion:  43yo female presents for evaluation and management of left knee pain over the last several months, which started 1 week after she twisted her left knee while helping care for her ailing elderly family member, adjusting them in their bed. Unfortunately there family member recently passed away. Has ongoing medial left knee pain, worse with max flexion while kneeling on the floor, (for praying 5 times a day), which does worsen briefly while getting up from that position. No notable swelling. No specific pseudoinstability or instability. Admits that she has not done much to care for this injury as she is very busy working several jobs and taking care of her autistic son. Despite lack of interventions, it has slightly improved over time. Recently had MRI which was ordered by her PCP. Impression:  # Chronic left knee pain, initial encounter    # Left MCL sprain, grade 1    Plan:  Educated patient regarding condition. Reviewed MRI images independently, as well as with patient. > start home exercise plan; strongly encouraged adherence  > Use ice as needed  > Continue to refrain from her praying position on the ground, until she is able to perform a slow double leg deep squat without pain    Follow-up as needed       No orders of the defined types were placed in this encounter. Management plan & patient instructions discussed with Katy Lanes, who voiced understanding.     Thank you for the opportunity to participate in the care of this patient. If any questions or concerns at all, please feel free to contact me. This document may have been created with the aid of dictation software. Text may contain errors, particularly phonetic errors. Marvin Sams MD  Internal Medicine, Family Medicine & Sports Medicine  2022      Subjective   History:     I was asked to provide consultative services by Chris Rizzo MD  for advice/opinion related to evaluating    Chief Complaint   Patient presents with    Knee Pain     Left       Dontae Recio is a 36 y.o. 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 Prior Treatments for this complaint:   none    Previous Medications for this complaint:   none    Current Medications for this complaint:  Occasional IBU  before bed - helpful    Previous work-up for this complaint has included:   Radiograph(s)   MRI    History reviewed. No pertinent past medical history. Past Surgical History:   Procedure Laterality Date    HX  SECTION        reports that she has never smoked. She has never used smokeless tobacco. She reports that she does not drink alcohol and does not use drugs.   Family History   Problem Relation Age of Onset    Diabetes Mother     Hypertension Father      No Known Allergies    Problem List:      Patient Active Problem List    Diagnosis    Chronic bilateral low back pain    Vitamin D deficiency       Medications:     Current Outpatient Medications on File Prior to Visit   Medication Sig Dispense Refill    ergocalciferol (Vitamin D2) 1,250 mcg (50,000 unit) capsule Take 1 Capsule by mouth every seven (7) days for 90 days. 12 Capsule 3     No current facility-administered medications on file prior to visit. Objective   Physical Assessment:   VS:    Vitals:    11/18/22 1526   PainSc:   5   PainLoc: Knee       Physical Exam  Nursing note reviewed. Constitutional:       General: She is not in acute distress. Appearance: Normal appearance. She is well-developed. She is not diaphoretic. HENT:      Head: Normocephalic and atraumatic. Eyes:      Conjunctiva/sclera: Conjunctivae normal.   Musculoskeletal:      Cervical back: Neck supple. Right hip: No tenderness (piriformis) or bony tenderness (great troch). Normal range of motion (neg BEVERLY/FADIR). Normal strength. Left hip: No tenderness (piriformis) or bony tenderness (great troch). Normal range of motion (neg BEVERLY/FADIR). Normal strength. Right knee: No bony tenderness. Normal range of motion. No tenderness. No LCL laxity or MCL laxity. Instability Tests: Anterior Lachman test negative. Medial Guera test negative and lateral Guera test negative. Left knee: No bony tenderness. Normal range of motion. Tenderness present over the MCL. No medial joint line or lateral joint line tenderness. No LCL laxity or MCL laxity. Instability Tests: Anterior Lachman test negative. Medial Guera test negative and lateral Guera test negative. Skin:     General: Skin is warm and dry. Findings: No rash. Neurological:      Mental Status: She is alert and oriented to person, place, and time. Psychiatric:         Behavior: Behavior normal. Behavior is cooperative. Thought Content:  Thought content normal.       Recent Labs & Imaging:     XR Results (maximum last 3):  Results from Appointment encounter on 09/29/22    XR KNEE LT 3 V    Narrative  EXAM: XR KNEE LT 3 V    CLINICAL INDICATION/HISTORY: 42 years Female. Pain for 2 to 3 months. No trauma. ADDITIONAL HISTORY: None    TECHNIQUE: 3 views of the left knee. COMPARISON: 2/26/2020      FINDINGS:    The lateral view is limited due to overpenetration. No acute fracture is  detected. Alignment is anatomic. No aggressive osseous lesion is identified. Joint spaces are essentially preserved. . Trace fluid in the suprapatellar  recess, which is likely in the range of normal.    Impression  No significant abnormality detected. MRI L knee (10/26/2022):    MR KNEE WO IV CON LEFT: 10/26/2022 17:32     CLINICAL INFORMATION   M25.562: Left knee pain, unspecified chronicity   S89. 92XD: Left knee injury, subsequent encounter. COMPARISON   None. TECHNIQUE   Multiplane MR images of the left knee obtained including T1 and T2-weighted sequences. FINDINGS   MEDIAL COMPARTMENT   Medial meniscus: Intact. Medial collateral ligament: Grade 1 sprain. Chondral surface: Grade 1 chondrosis. LATERAL COMPARTMENT   Lateral meniscus: Intact. Lateral collateral ligament complex: Intact. Chondral surface: Generalized grade 1 chondrosis. Grade 1/2 chondrosis of portions of the lateral tibial plateau with slight cartilage heterogeneity. INTERCONDYLAR COMPARTMENT   Anterior cruciate ligament: Intact. Posterior cruciate ligament: Intact. ANTERIOR COMPARTMENT   Chondral surfaces: Grade 1 chondrosis. Retinaculum: The patellofemoral retinaculum and patellofemoral ligaments are intact. Extensor mechanism: Normal signal intensity of the quadriceps and patellar tendons. JOINT FLUID/BURSA   Joint space: Physiologic knee joint fluid present. Baker's cyst: No evidence of Baker's cyst.     OSSEOUS STRUCTURES   There are no fractures or regions of abnormal bone marrow signal intensity.      MUSCLES/OTHER   Normal muscle volume and signal intensity. Mild edema superficial to the origin of the medial head of the gastrocnemius. IMPRESSION     Grade 1 MCL sprain. Intact menisci. Low-grade tricompartmental chondrosis. Review of Previous Medical Records:     PCP note (9/29/2022):    Left knee pain for few month she was helping her elderly aunt when her and put put her body weigt on a bed at that time she felt she has sprained her knee since then she has  having pain with siting and bending in the medial side of her left knee .

## 2022-11-18 NOTE — LETTER
11/19/2022    Patient: Edil Sanchez   YOB: 1980   Date of Visit: 11/18/2022     Elsy Isaac MD  [address] [place] [facility]    Dear Elsy Isaac MD,      Thank you for referring Ms. Edil Sanchez to [facility] for evaluation. My notes for this consultation are attached. If you have questions, please do not hesitate to call me. I look forward to following your patient along with you.       Sincerely,    Rafael Daniels MD

## 2022-11-18 NOTE — PATIENT INSTRUCTIONS
- work on the exercises. Perform them SLOWLY because we are working on improving your knee stability  - use ice (or a bag of frozen vegetables) as needed on the painful spot    You must be able to get into a low squat and come back up without pain before you should go back to praying when kneeling on the ground      VISIT SURVEY       You may receive a survey regarding your visit today either by mail or email. Please take the opportunity to let us know how we did. This information helps us continue to improve and provide a great patient experience.

## 2023-02-22 ENCOUNTER — HOSPITAL ENCOUNTER (OUTPATIENT)
Dept: WOMENS IMAGING | Facility: HOSPITAL | Age: 43
Discharge: HOME | End: 2023-02-25
Payer: MEDICAID

## 2023-02-22 DIAGNOSIS — Z12.31 VISIT FOR SCREENING MAMMOGRAM: ICD-10-CM

## 2023-02-22 PROCEDURE — 77063 BREAST TOMOSYNTHESIS BI: CPT

## 2023-04-03 DIAGNOSIS — M62.830 MUSCLE SPASM OF BACK: Primary | ICD-10-CM

## 2023-04-03 RX ORDER — CYCLOBENZAPRINE HCL 10 MG
10 TABLET ORAL 3 TIMES DAILY PRN
Qty: 270 TABLET | Refills: 1 | Status: SHIPPED | OUTPATIENT
Start: 2023-04-03 | End: 2023-07-02

## 2023-05-23 DIAGNOSIS — R25.3 EYELID TWITCH: Primary | ICD-10-CM

## 2023-05-24 ENCOUNTER — TELEPHONE (OUTPATIENT)
Dept: FAMILY MEDICINE CLINIC | Facility: CLINIC | Age: 43
End: 2023-05-24

## 2024-03-23 SDOH — HEALTH STABILITY: PHYSICAL HEALTH: ON AVERAGE, HOW MANY MINUTES DO YOU ENGAGE IN EXERCISE AT THIS LEVEL?: 0 MIN

## 2024-03-23 SDOH — HEALTH STABILITY: PHYSICAL HEALTH: ON AVERAGE, HOW MANY DAYS PER WEEK DO YOU ENGAGE IN MODERATE TO STRENUOUS EXERCISE (LIKE A BRISK WALK)?: 0 DAYS

## 2024-03-26 ENCOUNTER — OFFICE VISIT (OUTPATIENT)
Dept: FAMILY MEDICINE CLINIC | Facility: CLINIC | Age: 44
End: 2024-03-26
Payer: MEDICAID

## 2024-03-26 VITALS
BODY MASS INDEX: 26.83 KG/M2 | OXYGEN SATURATION: 100 % | SYSTOLIC BLOOD PRESSURE: 121 MMHG | TEMPERATURE: 98.1 F | WEIGHT: 177 LBS | DIASTOLIC BLOOD PRESSURE: 80 MMHG | HEIGHT: 68 IN | HEART RATE: 82 BPM | RESPIRATION RATE: 14 BRPM

## 2024-03-26 DIAGNOSIS — Z12.31 ENCOUNTER FOR SCREENING MAMMOGRAM FOR MALIGNANT NEOPLASM OF BREAST: ICD-10-CM

## 2024-03-26 DIAGNOSIS — Z76.89 ESTABLISHING CARE WITH NEW DOCTOR, ENCOUNTER FOR: Primary | ICD-10-CM

## 2024-03-26 DIAGNOSIS — J32.9 RECURRENT SINUSITIS: ICD-10-CM

## 2024-03-26 DIAGNOSIS — E55.9 VITAMIN D DEFICIENCY: ICD-10-CM

## 2024-03-26 PROCEDURE — 90715 TDAP VACCINE 7 YRS/> IM: CPT | Performed by: STUDENT IN AN ORGANIZED HEALTH CARE EDUCATION/TRAINING PROGRAM

## 2024-03-26 PROCEDURE — 99214 OFFICE O/P EST MOD 30 MIN: CPT | Performed by: STUDENT IN AN ORGANIZED HEALTH CARE EDUCATION/TRAINING PROGRAM

## 2024-03-26 PROCEDURE — 90471 IMMUNIZATION ADMIN: CPT | Performed by: STUDENT IN AN ORGANIZED HEALTH CARE EDUCATION/TRAINING PROGRAM

## 2024-03-26 RX ORDER — CETIRIZINE HYDROCHLORIDE 10 MG/1
10 TABLET ORAL DAILY
Qty: 90 TABLET | Refills: 1 | Status: SHIPPED | OUTPATIENT
Start: 2024-03-26

## 2024-03-26 RX ORDER — LEVONORGESTREL 52 MG/1
INTRAUTERINE DEVICE INTRAUTERINE
COMMUNITY

## 2024-03-26 SDOH — ECONOMIC STABILITY: FOOD INSECURITY: WITHIN THE PAST 12 MONTHS, YOU WORRIED THAT YOUR FOOD WOULD RUN OUT BEFORE YOU GOT MONEY TO BUY MORE.: PATIENT DECLINED

## 2024-03-26 SDOH — ECONOMIC STABILITY: HOUSING INSECURITY
IN THE LAST 12 MONTHS, WAS THERE A TIME WHEN YOU DID NOT HAVE A STEADY PLACE TO SLEEP OR SLEPT IN A SHELTER (INCLUDING NOW)?: PATIENT DECLINED

## 2024-03-26 SDOH — ECONOMIC STABILITY: INCOME INSECURITY: HOW HARD IS IT FOR YOU TO PAY FOR THE VERY BASICS LIKE FOOD, HOUSING, MEDICAL CARE, AND HEATING?: PATIENT DECLINED

## 2024-03-26 SDOH — ECONOMIC STABILITY: FOOD INSECURITY: WITHIN THE PAST 12 MONTHS, THE FOOD YOU BOUGHT JUST DIDN'T LAST AND YOU DIDN'T HAVE MONEY TO GET MORE.: PATIENT DECLINED

## 2024-03-26 ASSESSMENT — PATIENT HEALTH QUESTIONNAIRE - PHQ9
SUM OF ALL RESPONSES TO PHQ QUESTIONS 1-9: 0
SUM OF ALL RESPONSES TO PHQ QUESTIONS 1-9: 0
SUM OF ALL RESPONSES TO PHQ9 QUESTIONS 1 & 2: 0
1. LITTLE INTEREST OR PLEASURE IN DOING THINGS: NOT AT ALL
2. FEELING DOWN, DEPRESSED OR HOPELESS: NOT AT ALL
SUM OF ALL RESPONSES TO PHQ QUESTIONS 1-9: 0
SUM OF ALL RESPONSES TO PHQ QUESTIONS 1-9: 0

## 2024-03-26 ASSESSMENT — ENCOUNTER SYMPTOMS
SHORTNESS OF BREATH: 0
SINUS PAIN: 0
CHEST TIGHTNESS: 0

## 2024-03-26 NOTE — PROGRESS NOTES
Tdap Immunization/s administered 3/26/2024 by Christiane Maier LPN   Patient tolerated procedure well.  No reactions noted.

## 2024-03-26 NOTE — PROGRESS NOTES
Kareem Lopez is a 43 y.o. female (: 1980) presenting to address:    Chief Complaint   Patient presents with    Establish Care       Vitals:    24 1113   BP: 121/80   Pulse: 82   Resp: 14   Temp: 98.1 °F (36.7 °C)   SpO2: 100%       \"Have you been to the ER, urgent care clinic since your last visit?  Hospitalized since your last visit?\"    Yes, ER and urgent care     “Have you seen or consulted any other health care providers outside of [facility] since your last visit?”    NO

## 2024-03-26 NOTE — PROGRESS NOTES
[facility]    HISTORY OF PRESENT ILLNESS  Kareem Lopez  is a 43 y.o. y.o. female here to [facility].    Recurrent sinusitis  -would like referral to ENT  -has been on multiple abx   -prescribed flonase    Health Maintenance:    Cervical Cancer Screen/PAP:  wnl, repeat   Breast Cancer Screen/Mammogram:  wnl  HCV Screen: No results found for: \"HEPCAB\"   DEXA:   No results found for this or any previous visit from the past 3650 days.     Colon Cancer Screening: n/a    Smoking Status:   Tobacco Use      Smoking status: Never      Smokeless tobacco: Never     Lung Cancer Screening:  CT Low Dose n/a       Sexually Active: Yes  Using Contraception: Yes, mirena IUD  Taking Prenatals: No    Immunizations:  Flu vaccine- Recommended every fall  COVID vaccine primary series- complete  Tetanus- Tdap   Shingrix- series not completed  RSV-not recommended  Pneumovax 23-  N/A  Prevnar 20- at age 64    Social: has 4 kids, part time job, studying at college, from [employer]    Mr#: [id number]      History reviewed. No pertinent past medical history.    Past Surgical History:   Procedure Laterality Date     SECTION         Family History   Problem Relation Age of Onset    Diabetes Mother     Hypertension Father        No Known Allergies    Social History     Tobacco Use   Smoking Status Never   Smokeless Tobacco Never       Social History     Substance and Sexual Activity   Alcohol Use No       Immunization History   Administered Date(s) Administered    COVID-19, PFIZER PURPLE top, DILUTE for use, (age 12 y+), 30mcg/0.3mL 2021, 2021    Influenza Virus Vaccine 2014    TDaP, ADACEL (age 10y-64y), BOOSTRIX (age 10y+), IM, 0.5mL 10/21/2014, 2024       Patient Active Problem List   Diagnosis    Chronic bilateral low back pain    Vitamin D deficiency         Current Outpatient Medications:     levonorgestrel (MIRENA, 52 MG,) IUD 52 mg, Mirena, Disp: , Rfl:     cetirizine

## 2024-03-27 ENCOUNTER — PATIENT MESSAGE (OUTPATIENT)
Dept: FAMILY MEDICINE CLINIC | Facility: CLINIC | Age: 44
End: 2024-03-27

## 2024-03-27 LAB
25(OH)D3+25(OH)D2 SERPL-MCNC: 21.3 NG/ML (ref 30–100)
BUN SERPL-MCNC: 11 MG/DL (ref 6–24)
BUN/CREAT SERPL: 23 (ref 9–23)
CALCIUM SERPL-MCNC: 9.7 MG/DL (ref 8.7–10.2)
CHLORIDE SERPL-SCNC: 101 MMOL/L (ref 96–106)
CHOLEST SERPL-MCNC: 166 MG/DL (ref 100–199)
CO2 SERPL-SCNC: 22 MMOL/L (ref 20–29)
CREAT SERPL-MCNC: 0.48 MG/DL (ref 0.57–1)
EGFRCR SERPLBLD CKD-EPI 2021: 120 ML/MIN/1.73
ERYTHROCYTE [DISTWIDTH] IN BLOOD BY AUTOMATED COUNT: 11.8 % (ref 11.7–15.4)
GLUCOSE SERPL-MCNC: 96 MG/DL (ref 70–99)
HBA1C MFR BLD: 4.9 % (ref 4.8–5.6)
HCT VFR BLD AUTO: 39.5 % (ref 34–46.6)
HCV AB SERPL QL IA: NORMAL
HCV IGG SERPL QL IA: NON REACTIVE
HDLC SERPL-MCNC: 54 MG/DL
HGB BLD-MCNC: 12.8 G/DL (ref 11.1–15.9)
HIV 1+2 AB+HIV1 P24 AG SERPL QL IA: NON REACTIVE
LDLC SERPL CALC-MCNC: 105 MG/DL (ref 0–99)
MCH RBC QN AUTO: 29.6 PG (ref 26.6–33)
MCHC RBC AUTO-ENTMCNC: 32.4 G/DL (ref 31.5–35.7)
MCV RBC AUTO: 91 FL (ref 79–97)
PLATELET # BLD AUTO: 315 X10E3/UL (ref 150–450)
POTASSIUM SERPL-SCNC: 4.5 MMOL/L (ref 3.5–5.2)
RBC # BLD AUTO: 4.33 X10E6/UL (ref 3.77–5.28)
SODIUM SERPL-SCNC: 140 MMOL/L (ref 134–144)
TRIGL SERPL-MCNC: 31 MG/DL (ref 0–149)
VLDLC SERPL CALC-MCNC: 7 MG/DL (ref 5–40)
WBC # BLD AUTO: 8.1 X10E3/UL (ref 3.4–10.8)

## 2024-04-01 NOTE — TELEPHONE ENCOUNTER
From: LARY PARKINSON  To: Kareem Lopez  Sent: 3/27/2024 2:58 PM EDT  Subject: vitamin D    Vitamin D is slightly low, which indicates stable from years prior. I recommend he take the counter supplement daily. The rest of your labs were normal.

## 2024-04-08 RX ORDER — ERGOCALCIFEROL 1.25 MG/1
50000 CAPSULE ORAL
Qty: 12 CAPSULE | Refills: 1 | Status: SHIPPED | OUTPATIENT
Start: 2024-04-08

## 2024-05-01 NOTE — PROGRESS NOTES
PHYSICAL THERAPY - DAILY TREATMENT NOTE    Patient Name: Plii Collier        Date: 2018  : 1980   YES Patient  Verified  Visit #:      12  Insurance: Payor: Rufino Shields / Plan: Talon Marcum / Product Type: Managed Care Medicaid /      In time: 575 Out time: 246   Total Treatment Time: 42     Medicare Time Tracking (below)   Total Timed Codes (min):  42 1:1 Treatment Time:  30     TREATMENT AREA =  Low back pain [M54.5]  Other muscle spasm [M62.838]    SUBJECTIVE  Pain Level (on 0 to 10 scale):  1  / 10   Medication Changes/New allergies or changes in medical history, any new surgeries or procedures? NO    If yes, update Summary List   Subjective Functional Status/Changes:  []  No changes reported     Patient reports she has been performing her HEP, but not twice a day as prescribed. Patient feels she is doing better since her IE.          OBJECTIVE    30 min Therapeutic Exercise:  [x]  See flow sheet   Rationale:      increase ROM and increase strength to improve the patients ability to perform household activities. 12 min Manual Therapy: STM to thoracolumbar junction, thoracolumbar P-A mobs in prone   Rationale:      decrease pain, increase ROM, increase tissue extensibility and decrease trigger points to improve patient's ability to perform walking activities. min Patient Education:  YES  Reviewed HEP   []  Progressed/Changed HEP based on: Other Objective/Functional Measures:    1:1 TE 18'  Tenderness noted throughout thoracolumbar junction during MT  Progressed therapy program per flowsheet in order to improve core and hip strength/stability     Post Treatment Pain Level (on 0 to 10) scale:   4  / 10     ASSESSMENT  Assessment/Changes in Function:     Patient noted elevated pain levels post session, however declined modalities.  Patient educated on possible increase in soreness related to initiation of new program. Also educated on performing HEP as prescribed. []  See Progress Note/Recertification   Patient will continue to benefit from skilled PT services to modify and progress therapeutic interventions, address functional mobility deficits, address ROM deficits, address strength deficits, analyze and address soft tissue restrictions, analyze and cue movement patterns, analyze and modify body mechanics/ergonomics and assess and modify postural abnormalities to attain remaining goals.    Progress toward goals / Updated goals:    Progressing with STG#1     PLAN  [x]  Upgrade activities as tolerated YES Continue plan of care   []  Discharge due to :    []  Other:      Therapist: Cristian Martin, PT    Date: 12/27/2018 Time: 3:40 PM     Future Appointments   Date Time Provider Hema Flannery   1/2/2019  4:00 PM [facility]   1/15/2019  4:15 PM Griselda Rachel  [facility] Unable to assess

## 2024-05-13 RX ORDER — CYCLOBENZAPRINE HCL 5 MG
5 TABLET ORAL 3 TIMES DAILY PRN
Qty: 30 TABLET | Refills: 0 | Status: SHIPPED | OUTPATIENT
Start: 2024-05-13 | End: 2024-05-23

## 2024-05-13 RX ORDER — ERGOCALCIFEROL 1.25 MG/1
50000 CAPSULE ORAL
Qty: 12 CAPSULE | Refills: 1 | Status: SHIPPED | OUTPATIENT
Start: 2024-05-13

## 2024-07-03 ENCOUNTER — OFFICE VISIT (OUTPATIENT)
Dept: FAMILY MEDICINE CLINIC | Facility: CLINIC | Age: 44
End: 2024-07-03
Payer: MEDICAID

## 2024-07-03 DIAGNOSIS — Z11.1 PPD SCREENING TEST: Primary | ICD-10-CM

## 2024-07-03 PROCEDURE — 86580 TB INTRADERMAL TEST: CPT | Performed by: STUDENT IN AN ORGANIZED HEALTH CARE EDUCATION/TRAINING PROGRAM

## 2024-07-03 NOTE — PROGRESS NOTES
HISTORY OF PRESENT ILLNESS  Kareem Lopez  is a 43 y.o. y.o. female    She presents reporting decreased sensation in the left great toe extending onto the distal aspect of the medial forefoot after hitting her foot on a drawer left on the floor by her son.  At first she had pain but subsequently the pain resolved leaving only the \"numbness\".  She denies any pain at this point with weightbearing.  There are no wounds.        Mr#: [id number]      No past medical history on file.    Past Surgical History:   Procedure Laterality Date     SECTION         Family History   Problem Relation Age of Onset    Diabetes Mother     Hypertension Father        No Known Allergies    Social History     Tobacco Use   Smoking Status Never   Smokeless Tobacco Never       Social History     Substance and Sexual Activity   Alcohol Use No       Immunization History   Administered Date(s) Administered    COVID-19, PFIZER PURPLE top, DILUTE for use, (age 12 y+), 30mcg/0.3mL 2021, 2021    Influenza Virus Vaccine 2014    PPD Test 2024    TDaP, ADACEL (age 10y-64y), BOOSTRIX (age 10y+), IM, 0.5mL 10/21/2014, 2024       Patient Active Problem List   Diagnosis    Chronic bilateral low back pain    Vitamin D deficiency         Current Outpatient Medications:     vitamin D (ERGOCALCIFEROL) 1.25 MG (45654 UT) CAPS capsule, Take 1 capsule by mouth every 7 days, Disp: 12 capsule, Rfl: 1    levonorgestrel (MIRENA, 52 MG,) IUD 52 mg, Mirena, Disp: , Rfl:     cetirizine (ZYRTEC) 10 MG tablet, Take 1 tablet by mouth daily, Disp: 90 tablet, Rfl: 1      Review of Systems   Constitutional:  Negative for fever.   Musculoskeletal:  Negative for arthralgias.   Neurological:  Positive for numbness (Left great toe and adjacent foot, see HPI).       /80   Pulse 98   Temp 98.4 °F (36.9 °C) (Temporal)   Resp 16   Ht 1.727 m (5' 8\")   Wt 78.9 kg (174 lb)   SpO2 98%   BMI 26.46 kg/m²     Physical Exam  Vitals and

## 2024-07-03 NOTE — PROGRESS NOTES
PPD Placement note  Kareem Lopez, 43 y.o. female is here today for placement of PPD test  Reason for PPD test: Work  Pt taken PPD test before: no  Verified in allergy area and with patient that they are not allergic to the products PPD is made of (Phenol or Tween). No: NKDA  Is patient taking any oral or IV steroid medication now or have they taken it in the last month? no  Has the patient ever received the BCG vaccine?: no  Has the patient been in recent contact with anyone known or suspected of having active TB disease?: no       Date of exposure (if applicable): N/A       Name of person they were exposed to (if applicable): N/A  Patient's Country of origin?: N/A  O: Alert and oriented in NAD.  P:  PPD placed on 7/3/2024.  Patient advised to return for reading within 48-72 hours.

## 2024-07-05 ENCOUNTER — OFFICE VISIT (OUTPATIENT)
Dept: FAMILY MEDICINE CLINIC | Facility: CLINIC | Age: 44
End: 2024-07-05
Payer: MEDICAID

## 2024-07-05 VITALS
HEIGHT: 68 IN | SYSTOLIC BLOOD PRESSURE: 120 MMHG | DIASTOLIC BLOOD PRESSURE: 80 MMHG | WEIGHT: 174 LBS | HEART RATE: 98 BPM | RESPIRATION RATE: 16 BRPM | OXYGEN SATURATION: 98 % | BODY MASS INDEX: 26.37 KG/M2 | TEMPERATURE: 98.4 F

## 2024-07-05 DIAGNOSIS — R20.0 NUMBNESS OF LEFT FOOT: Primary | ICD-10-CM

## 2024-07-05 LAB
MM INDURATION, POC: 0 MM (ref 0–5)
PPD, POC: NEGATIVE

## 2024-07-05 PROCEDURE — 99213 OFFICE O/P EST LOW 20 MIN: CPT | Performed by: FAMILY MEDICINE

## 2024-07-05 RX ORDER — PREDNISONE 10 MG/1
TABLET ORAL
Qty: 1 EACH | Refills: 0 | Status: SHIPPED | OUTPATIENT
Start: 2024-07-05

## 2024-07-05 NOTE — PROGRESS NOTES
Kareem Lopez is a 43 y.o. female (: 1980) presenting to address:    Chief Complaint   Patient presents with    Leg Pain     Left leg hit it on something and now across the foot certain areas are numb     PPD Reading       Vitals:    24 1441   BP: 120/80   Pulse: 98   Resp: 16   Temp: 98.4 °F (36.9 °C)   SpO2: 98%       \"Have you been to the ER, urgent care clinic since your last visit?  Hospitalized since your last visit?\"    NO    “Have you seen or consulted any other health care providers [facility] since [facility] visit?”    NO       PPD Reading Note  PPD read and results entered in [facility].  Result: 0 mm induration.  Interpretation: negative   If test not read within 48-72 hours of initial placement, patient advised to repeat in other arm 1-3 weeks after this test.  Allergic reaction: no

## 2024-07-05 NOTE — PATIENT INSTRUCTIONS
Current Status:  Patient reports decreased sensation along the lateral and medial aspect of the left great toe extending onto the foot since hitting her foot on a drawer on the floor 2 weeks ago.  She feels that as of today it is slightly improved.  With seemingly most likely represent a neuropathy associated with soft tissue swelling from the injury.  Dorsal pedal pulses intact and no deformity or swelling are noted now.  There seems to be decreased sensation to monofilament testing in the areas described above.  Questionably decreased strength with flexion and extension of the great toe.    Plan:  Prednisone 10 mg Dosepak as directed  Follow-up with PCP for new symptoms, worsening symptoms or failure to improve